# Patient Record
Sex: FEMALE | Race: OTHER | HISPANIC OR LATINO | URBAN - METROPOLITAN AREA
[De-identification: names, ages, dates, MRNs, and addresses within clinical notes are randomized per-mention and may not be internally consistent; named-entity substitution may affect disease eponyms.]

---

## 2018-06-04 ENCOUNTER — EMERGENCY (EMERGENCY)
Facility: HOSPITAL | Age: 33
LOS: 1 days | Discharge: HOME | End: 2018-06-04
Attending: EMERGENCY MEDICINE | Admitting: EMERGENCY MEDICINE
Payer: MEDICAID

## 2018-06-04 VITALS
SYSTOLIC BLOOD PRESSURE: 114 MMHG | DIASTOLIC BLOOD PRESSURE: 71 MMHG | HEART RATE: 61 BPM | OXYGEN SATURATION: 100 % | RESPIRATION RATE: 14 BRPM

## 2018-06-04 VITALS
RESPIRATION RATE: 16 BRPM | SYSTOLIC BLOOD PRESSURE: 120 MMHG | TEMPERATURE: 98 F | HEART RATE: 86 BPM | DIASTOLIC BLOOD PRESSURE: 79 MMHG | OXYGEN SATURATION: 100 %

## 2018-06-04 LAB
ALBUMIN SERPL ELPH-MCNC: 4.3 G/DL — SIGNIFICANT CHANGE UP (ref 3.3–5)
ALP SERPL-CCNC: 66 U/L — SIGNIFICANT CHANGE UP (ref 40–120)
ALT FLD-CCNC: 20 U/L — SIGNIFICANT CHANGE UP (ref 4–33)
APPEARANCE UR: SIGNIFICANT CHANGE UP
AST SERPL-CCNC: 30 U/L — SIGNIFICANT CHANGE UP (ref 4–32)
BACTERIA # UR AUTO: SIGNIFICANT CHANGE UP
BASE EXCESS BLDV CALC-SCNC: -2 MMOL/L — SIGNIFICANT CHANGE UP
BASOPHILS # BLD AUTO: 0.02 K/UL — SIGNIFICANT CHANGE UP (ref 0–0.2)
BASOPHILS NFR BLD AUTO: 0.5 % — SIGNIFICANT CHANGE UP (ref 0–2)
BILIRUB SERPL-MCNC: 0.3 MG/DL — SIGNIFICANT CHANGE UP (ref 0.2–1.2)
BILIRUB UR-MCNC: NEGATIVE — SIGNIFICANT CHANGE UP
BLOOD GAS VENOUS - CREATININE: 0.59 MG/DL — SIGNIFICANT CHANGE UP (ref 0.5–1.3)
BLOOD UR QL VISUAL: HIGH
BUN SERPL-MCNC: 10 MG/DL — SIGNIFICANT CHANGE UP (ref 7–23)
CALCIUM SERPL-MCNC: 8.7 MG/DL — SIGNIFICANT CHANGE UP (ref 8.4–10.5)
CHLORIDE BLDV-SCNC: 111 MMOL/L — HIGH (ref 96–108)
CHLORIDE SERPL-SCNC: 102 MMOL/L — SIGNIFICANT CHANGE UP (ref 98–107)
CO2 SERPL-SCNC: 22 MMOL/L — SIGNIFICANT CHANGE UP (ref 22–31)
COLOR SPEC: YELLOW — SIGNIFICANT CHANGE UP
CREAT SERPL-MCNC: 0.68 MG/DL — SIGNIFICANT CHANGE UP (ref 0.5–1.3)
EOSINOPHIL # BLD AUTO: 0.11 K/UL — SIGNIFICANT CHANGE UP (ref 0–0.5)
EOSINOPHIL NFR BLD AUTO: 2.6 % — SIGNIFICANT CHANGE UP (ref 0–6)
GAS PNL BLDV: 134 MMOL/L — LOW (ref 136–146)
GLUCOSE BLDV-MCNC: 126 — HIGH (ref 70–99)
GLUCOSE SERPL-MCNC: 133 MG/DL — HIGH (ref 70–99)
GLUCOSE UR-MCNC: NEGATIVE — SIGNIFICANT CHANGE UP
HCG SERPL-ACNC: < 5 MIU/ML — SIGNIFICANT CHANGE UP
HCO3 BLDV-SCNC: 23 MMOL/L — SIGNIFICANT CHANGE UP (ref 20–27)
HCT VFR BLD CALC: 36.6 % — SIGNIFICANT CHANGE UP (ref 34.5–45)
HCT VFR BLDV CALC: 41.5 % — SIGNIFICANT CHANGE UP (ref 34.5–45)
HGB BLD-MCNC: 12.5 G/DL — SIGNIFICANT CHANGE UP (ref 11.5–15.5)
HGB BLDV-MCNC: 13.5 G/DL — SIGNIFICANT CHANGE UP (ref 11.5–15.5)
IMM GRANULOCYTES # BLD AUTO: 0 # — SIGNIFICANT CHANGE UP
IMM GRANULOCYTES NFR BLD AUTO: 0 % — SIGNIFICANT CHANGE UP (ref 0–1.5)
KETONES UR-MCNC: NEGATIVE — SIGNIFICANT CHANGE UP
LACTATE BLDV-MCNC: 1.7 MMOL/L — SIGNIFICANT CHANGE UP (ref 0.5–2)
LEUKOCYTE ESTERASE UR-ACNC: NEGATIVE — SIGNIFICANT CHANGE UP
LIDOCAIN IGE QN: 38.7 U/L — SIGNIFICANT CHANGE UP (ref 7–60)
LYMPHOCYTES # BLD AUTO: 1.16 K/UL — SIGNIFICANT CHANGE UP (ref 1–3.3)
LYMPHOCYTES # BLD AUTO: 27.1 % — SIGNIFICANT CHANGE UP (ref 13–44)
MCHC RBC-ENTMCNC: 31.3 PG — SIGNIFICANT CHANGE UP (ref 27–34)
MCHC RBC-ENTMCNC: 34.2 % — SIGNIFICANT CHANGE UP (ref 32–36)
MCV RBC AUTO: 91.5 FL — SIGNIFICANT CHANGE UP (ref 80–100)
MONOCYTES # BLD AUTO: 0.35 K/UL — SIGNIFICANT CHANGE UP (ref 0–0.9)
MONOCYTES NFR BLD AUTO: 8.2 % — SIGNIFICANT CHANGE UP (ref 2–14)
MUCOUS THREADS # UR AUTO: SIGNIFICANT CHANGE UP
NEUTROPHILS # BLD AUTO: 2.64 K/UL — SIGNIFICANT CHANGE UP (ref 1.8–7.4)
NEUTROPHILS NFR BLD AUTO: 61.6 % — SIGNIFICANT CHANGE UP (ref 43–77)
NITRITE UR-MCNC: NEGATIVE — SIGNIFICANT CHANGE UP
NRBC # FLD: 0 — SIGNIFICANT CHANGE UP
OB PNL STL: NEGATIVE — SIGNIFICANT CHANGE UP
PCO2 BLDV: 38 MMHG — LOW (ref 41–51)
PH BLDV: 7.39 PH — SIGNIFICANT CHANGE UP (ref 7.32–7.43)
PH UR: 6.5 — SIGNIFICANT CHANGE UP (ref 4.6–8)
PLATELET # BLD AUTO: 214 K/UL — SIGNIFICANT CHANGE UP (ref 150–400)
PMV BLD: 9.7 FL — SIGNIFICANT CHANGE UP (ref 7–13)
PO2 BLDV: 53 MMHG — HIGH (ref 35–40)
POTASSIUM BLDV-SCNC: 4.1 MMOL/L — SIGNIFICANT CHANGE UP (ref 3.4–4.5)
POTASSIUM SERPL-MCNC: 4.1 MMOL/L — SIGNIFICANT CHANGE UP (ref 3.5–5.3)
POTASSIUM SERPL-SCNC: 4.1 MMOL/L — SIGNIFICANT CHANGE UP (ref 3.5–5.3)
PROT SERPL-MCNC: 7.6 G/DL — SIGNIFICANT CHANGE UP (ref 6–8.3)
PROT UR-MCNC: 20 MG/DL — SIGNIFICANT CHANGE UP
RBC # BLD: 4 M/UL — SIGNIFICANT CHANGE UP (ref 3.8–5.2)
RBC # FLD: 12.2 % — SIGNIFICANT CHANGE UP (ref 10.3–14.5)
RBC CASTS # UR COMP ASSIST: SIGNIFICANT CHANGE UP (ref 0–?)
SAO2 % BLDV: 85.8 % — HIGH (ref 60–85)
SODIUM SERPL-SCNC: 137 MMOL/L — SIGNIFICANT CHANGE UP (ref 135–145)
SP GR SPEC: 1.02 — SIGNIFICANT CHANGE UP (ref 1–1.04)
SQUAMOUS # UR AUTO: SIGNIFICANT CHANGE UP
UROBILINOGEN FLD QL: NORMAL MG/DL — SIGNIFICANT CHANGE UP
WBC # BLD: 4.28 K/UL — SIGNIFICANT CHANGE UP (ref 3.8–10.5)
WBC # FLD AUTO: 4.28 K/UL — SIGNIFICANT CHANGE UP (ref 3.8–10.5)
WBC UR QL: SIGNIFICANT CHANGE UP (ref 0–?)

## 2018-06-04 PROCEDURE — 76705 ECHO EXAM OF ABDOMEN: CPT | Mod: 26

## 2018-06-04 PROCEDURE — 99284 EMERGENCY DEPT VISIT MOD MDM: CPT | Mod: 25

## 2018-06-04 PROCEDURE — 74177 CT ABD & PELVIS W/CONTRAST: CPT | Mod: 26

## 2018-06-04 RX ORDER — LIDOCAINE 4 G/100G
10 CREAM TOPICAL ONCE
Qty: 0 | Refills: 0 | Status: COMPLETED | OUTPATIENT
Start: 2018-06-04 | End: 2018-06-04

## 2018-06-04 RX ORDER — SODIUM CHLORIDE 9 MG/ML
1000 INJECTION INTRAMUSCULAR; INTRAVENOUS; SUBCUTANEOUS ONCE
Qty: 0 | Refills: 0 | Status: COMPLETED | OUTPATIENT
Start: 2018-06-04 | End: 2018-06-04

## 2018-06-04 RX ORDER — ACETAMINOPHEN 500 MG
975 TABLET ORAL ONCE
Qty: 0 | Refills: 0 | Status: COMPLETED | OUTPATIENT
Start: 2018-06-04 | End: 2018-06-04

## 2018-06-04 RX ORDER — FAMOTIDINE 10 MG/ML
20 INJECTION INTRAVENOUS ONCE
Qty: 0 | Refills: 0 | Status: COMPLETED | OUTPATIENT
Start: 2018-06-04 | End: 2018-06-04

## 2018-06-04 RX ADMIN — Medication 30 MILLILITER(S): at 08:41

## 2018-06-04 RX ADMIN — FAMOTIDINE 20 MILLIGRAM(S): 10 INJECTION INTRAVENOUS at 08:41

## 2018-06-04 RX ADMIN — LIDOCAINE 10 MILLILITER(S): 4 CREAM TOPICAL at 08:41

## 2018-06-04 RX ADMIN — Medication 975 MILLIGRAM(S): at 11:27

## 2018-06-04 RX ADMIN — SODIUM CHLORIDE 1000 MILLILITER(S): 9 INJECTION INTRAMUSCULAR; INTRAVENOUS; SUBCUTANEOUS at 08:41

## 2018-06-04 NOTE — ED PROVIDER NOTE - OBJECTIVE STATEMENT
Sirisha Dewitt, DO: 33F with hx of asthma here for upper, b/l abdominal pain constant x 2-3 days better with tea & advil.  Pain has been ongoing 3-4 months, previously 1x a week. Previously thought to be related to pelvic infection, treated with antibiotics. Denies vaginal discharge or bleeding. LMP 05/09. + chronic constipation, last BM 2 days ago & abdominal distention, melena. No nausea/vomiting, diarrhea, hematochezia.     PMD: Clinic  PMH: Asthma  PSH: None  Allergies: None Sirisha Dewitt, DO: 33F with hx of asthma here for upper, b/l abdominal pain constant x 2-3 days better with tea & advil, worse with eating.  Pain has been ongoing 3-4 months, previously 1x a week and thought to be related to pelvic infection, treated with antibiotics. Denies vaginal discharge or bleeding. LMP 05/09. + chronic constipation, last BM 2 days ago & abdominal distention, melena. No nausea/vomiting, diarrhea, hematochezia, urinary symptoms.    PMH: Asthma  PSH: None  Allergies: None  Social Hx: non-smoker, no ETOH  PMD: Clinic Sirisha Dewitt, DO: 33F with hx of asthma here for upper, b/l abdominal pain constant x 2-3 days better with tea & advil, worse with eating.  Pain has been ongoing 3-4 months, previously 1x a week and thought to be related to pelvic infection, treated with antibiotics. Denies vaginal discharge or bleeding. LMP 05/09. + chronic constipation, last BM 2 days ago & abdominal distention, melena. No nausea/vomiting, diarrhea, hematochezia, urinary symptoms.    PMH: Asthma  PSH: None  Allergies: None  Social Hx: non-smoker, no ETOH  PMD: Clinic    Hx obtained via "[employer] Sirisha Dewitt, DO: 33F with hx of asthma here for upper, b/l abdominal pain constant x 2-3 days better with tea & advil, worse with eating.  Pain has been ongoing 3-4 months, previously 1x a week and thought to be related to pelvic infection, treated with antibiotics. Denies vaginal discharge or bleeding. LMP 05/09. + chronic constipation, last BM 2 days ago & abdominal distention, melena. No nausea/vomiting, diarrhea, hematochezia, urinary symptoms.     PMH: Asthma  PSH: None  Allergies: None  Social Hx: non-smoker, no ETOH  PMD: Clinic    Hx obtained via [employer].

## 2018-06-04 NOTE — ED PROVIDER NOTE - ATTENDING CONTRIBUTION TO CARE
ANTONELLA Attending Note - Dr. Sevilla  33F with hx of asthma here for upper, b/l abdominal pain constant x 2-3 days better with tea & advil, worse with eating reoccurring over past 3-4 months,  PE: pt is alert and oriented, EYE: perrl, ENT normal, membranes are moist, neck supple. no lymphadenopathy or thyroid enlargement, No JVD.  Chest clear to P&A, Heart- reg rhythm without murmur, rubs or gallops, radial pulses equal bilaterally.  Abd is soft, non-tender, Bowel sounds are active. no mass or organomegaly. : No CVA tenderness. Neuro:  Pt alert and oriented x 3. Perrl    Distal neurosensory is intact. Motor function is 5/5 strength bilaterally.  No focal deficits. Extremities:  No edema.  Skin: warm and dry.  Psych: No mood or thought abnormality.  Plan:     Impression: ANTONELLA Attending Note - Dr. Sevilla  33F with hx of asthma here for upper, b/l abdominal pain constant x 2-3 days better with tea & advil, worse with eating reoccurring over past 3-4 months,  PE: pt is alert and oriented, EYE: perrl, ENT normal, membranes are moist, neck supple. no lymphadenopathy or thyroid enlargement, No JVD.  Chest clear to P&A, Heart- reg rhythm without murmur, rubs or gallops, radial pulses equal bilaterally.  Abd is soft with right upper quadrant and epigastric tenderness. tender, Bowel sounds are active. no mass or organomegaly. : No CVA tenderness. Neuro:  Pt alert and oriented x 3. Perrl    Distal neurosensory is intact. Motor function is 5/5 strength bilaterally.  No focal deficits. Extremities:  No edema.  Skin: warm and dry.  Psych: No mood or thought abnormality.  Plan: Labs, including CBC, CMP, VBG, U/A, CXR, EKG, IV Hydration and ultrasound.       Impression:  Epigastric pain R/O Biliary colic vs. Acute Cholecystitis.

## 2018-06-04 NOTE — ED PROVIDER NOTE - CARE PLAN
Assessment and plan of treatment:	1. Please follow up with your PMD in 24-48 hours to review your results.   2. Please call a surgeon on the provided list OR Call [phone] to find a doctor affiliated with [facility] in your neighborhood.   3. Please return to the ED should you have any new or worsening symptoms or have any new concerns including but not limited to vomiting after every time you eat, worsening pain, fevers.  4. Read all attached. Principal Discharge DX:	Biliary colic symptom  Assessment and plan of treatment:	1. Please follow up with your PMD in 24-48 hours to review your results.   2. Please call a surgeon on the provided list OR Call [phone] to find a doctor affiliated with [facility] in your neighborhood.   3. Please return to the ED should you have any new or worsening symptoms or have any new concerns including but not limited to vomiting after every time you eat, worsening pain, fevers.  4. Read all attached.

## 2018-06-04 NOTE — ED PROVIDER NOTE - PROGRESS NOTE DETAILS
Sirisha Dewitt DO: Patient updated of results & provided with a copy. Patient to follow up with surgeon outpatient & given strict return precautions.

## 2018-06-04 NOTE — ED PROVIDER NOTE - PLAN OF CARE
1. Please follow up with your PMD in 24-48 hours to review your results.   2. Please call a surgeon on the provided list OR Call [phone] to find a doctor affiliated with [facility] in your neighborhood.   3. Please return to the ED should you have any new or worsening symptoms or have any new concerns including but not limited to vomiting after every time you eat, worsening pain, fevers.  4. Read all attached.

## 2018-06-04 NOTE — ED ADULT TRIAGE NOTE - CHIEF COMPLAINT QUOTE
C/o intermittent upper abdominal pain x 3 months, worse over past 3-4 days. Denies N/V/D, fevers or chills. Denies medical hx.

## 2018-06-04 NOTE — ED PROVIDER NOTE - PHYSICAL EXAMINATION
Sirisha Dewitt, DO:   Gen: Well appearing, NAD, obese  Head: NCAT  HEENT: PERRL, MMM, normal conjunctiva, anicteric, neck supple  Lung: CTAB, no adventitious sounds  CV: RRR, no murmurs, rubs or gallops  Abd: soft, TTP in RUQ without guarding, negative [place], no CVAT  MSK: No edema, no visible deformities  Neuro: Ambulatory with steady gait.   Skin: Warm and dry, no evidence of rash  Psych: normal mood and affect Sirisha Dewitt, DO:   Gen: Well appearing, NAD, obese  Head: NCAT  HEENT: PERRL, MMM, normal conjunctiva, anicteric, neck supple  Lung: CTAB, no adventitious sounds  CV: RRR, no murmurs, rubs or gallops  Abd: soft, TTP in RUQ without guarding, negative [place], no CVAT, no external hemorrhoids, stool brown without kika blood (chaperone: JOAQUINA saavedra).   MSK: No edema, no visible deformities  Neuro: Ambulatory with steady gait.   Skin: Warm and dry, no evidence of rash  Psych: normal mood and affect

## 2018-06-04 NOTE — ED PROVIDER NOTE - MEDICAL DECISION MAKING DETAILS
Siirsha Dewitt, DO: 33F with RUQ pain x 3 months seen at PMD and acutely worse over last 2-3 days, now constant. Pain worse with eating. Afebrile. LMP ~1 month ago. Suspect biliary vs. ulcerative cause. Plan for RUQ US, labs including lipase and possible CT scan if work-up negative. Patient aware that if work-up unremarkable, patient to follow up with gastroenterologist.

## 2018-06-05 ENCOUNTER — APPOINTMENT (OUTPATIENT)
Dept: SURGERY | Facility: CLINIC | Age: 33
End: 2018-06-05
Payer: MEDICAID

## 2018-06-05 VITALS
DIASTOLIC BLOOD PRESSURE: 85 MMHG | TEMPERATURE: 98.3 F | BODY MASS INDEX: 35.08 KG/M2 | SYSTOLIC BLOOD PRESSURE: 127 MMHG | WEIGHT: 198 LBS | HEART RATE: 102 BPM | HEIGHT: 63 IN

## 2018-06-05 PROBLEM — Z00.00 ENCOUNTER FOR PREVENTIVE HEALTH EXAMINATION: Status: ACTIVE | Noted: 2018-06-05

## 2018-06-05 PROCEDURE — 99202 OFFICE O/P NEW SF 15 MIN: CPT

## 2018-06-06 ENCOUNTER — TRANSCRIPTION ENCOUNTER (OUTPATIENT)
Age: 33
End: 2018-06-06

## 2018-06-06 ENCOUNTER — OUTPATIENT (OUTPATIENT)
Dept: OUTPATIENT SERVICES | Facility: HOSPITAL | Age: 33
LOS: 1 days | End: 2018-06-06
Payer: MEDICAID

## 2018-06-06 VITALS
HEART RATE: 80 BPM | RESPIRATION RATE: 14 BRPM | TEMPERATURE: 98 F | WEIGHT: 195.99 LBS | DIASTOLIC BLOOD PRESSURE: 85 MMHG | SYSTOLIC BLOOD PRESSURE: 124 MMHG | HEIGHT: 63 IN

## 2018-06-06 DIAGNOSIS — K80.10 CALCULUS OF GALLBLADDER WITH CHRONIC CHOLECYSTITIS WITHOUT OBSTRUCTION: ICD-10-CM

## 2018-06-06 DIAGNOSIS — Z01.818 ENCOUNTER FOR OTHER PREPROCEDURAL EXAMINATION: ICD-10-CM

## 2018-06-06 LAB — HCG SERPL-ACNC: <1 MIU/ML — SIGNIFICANT CHANGE UP

## 2018-06-06 PROCEDURE — 86850 RBC ANTIBODY SCREEN: CPT

## 2018-06-06 PROCEDURE — 86901 BLOOD TYPING SEROLOGIC RH(D): CPT

## 2018-06-06 PROCEDURE — G0463: CPT

## 2018-06-06 PROCEDURE — 86900 BLOOD TYPING SEROLOGIC ABO: CPT

## 2018-06-06 PROCEDURE — 84702 CHORIONIC GONADOTROPIN TEST: CPT

## 2018-06-06 RX ORDER — SODIUM CHLORIDE 9 MG/ML
1000 INJECTION, SOLUTION INTRAVENOUS
Qty: 0 | Refills: 0 | Status: DISCONTINUED | OUTPATIENT
Start: 2018-06-07 | End: 2018-06-07

## 2018-06-06 NOTE — H&P PST ADULT - RESPIRATORY AND THORAX COMMENTS
H/O Asthma - denies any Wheezing or need for her Ventolin Inhaler today - did note some wheezing earlier in the week

## 2018-06-06 NOTE — H&P PST ADULT - NSANTHOSAYNRD_GEN_A_CORE
No. BERYL screening performed.  STOP BANG Legend: 0-2 = LOW Risk; 3-4 = INTERMEDIATE Risk; 5-8 = HIGH Risk

## 2018-06-06 NOTE — H&P PST ADULT - HISTORY OF PRESENT ILLNESS
33 yr old Kittitian speaking female PMH Asthma presents for PST prior to Laparoscopic Cholecystectomy - Possible Open with Dr Jake Ramsey on 6/7/18 - pt notes she went to [facility] 6/4/18 with complaints of abdominal pain  - pt notes this has been going on for 3-4 months - (pt thought pain was related to a pelvic infection and was treated with ABX) CT Scan was obtained and indicated Gallstones - pt was discharged to home - instructed to follow up with Dr Ramsey - following consult and exam with Dr Ramsey pt is electing for scheduled procedure.    [place] [employer] Phone Used - [employer] [place] # [id number]

## 2018-06-06 NOTE — H&P PST ADULT - PROBLEM SELECTOR PLAN 1
PST Labs; HcG, Type & Screen done at [facility] - (Labs - CBC, CMP on chart from [facility] on 6/4/18) - No Medical Clearance Needed - pre-op instructions as well as pre-op wash instructions given to pt with understanding verbalized - pt also instructed to take Ventolin Inhaler morning of surgery and to bring her Ventolin inhaler with her in case needed - understanding of all instructions verbalized

## 2018-06-06 NOTE — H&P PST ADULT - ASSESSMENT
33 year old female with Calculus of Gallbladder with Chronic Cholecystitis without obstruction - scheduled for Laparoscopic Cholecystectomy - Poss Open with Dr Jake Ramsey on 6/7/18

## 2018-06-07 ENCOUNTER — RESULT REVIEW (OUTPATIENT)
Age: 33
End: 2018-06-07

## 2018-06-07 ENCOUNTER — APPOINTMENT (OUTPATIENT)
Dept: SURGERY | Facility: HOSPITAL | Age: 33
End: 2018-06-07

## 2018-06-07 ENCOUNTER — OUTPATIENT (OUTPATIENT)
Dept: OUTPATIENT SERVICES | Facility: HOSPITAL | Age: 33
LOS: 1 days | End: 2018-06-07
Payer: MEDICAID

## 2018-06-07 VITALS
RESPIRATION RATE: 14 BRPM | WEIGHT: 195.99 LBS | TEMPERATURE: 97 F | DIASTOLIC BLOOD PRESSURE: 70 MMHG | HEIGHT: 63 IN | HEART RATE: 86 BPM | SYSTOLIC BLOOD PRESSURE: 122 MMHG | OXYGEN SATURATION: 96 %

## 2018-06-07 VITALS
SYSTOLIC BLOOD PRESSURE: 122 MMHG | HEART RATE: 80 BPM | DIASTOLIC BLOOD PRESSURE: 78 MMHG | RESPIRATION RATE: 14 BRPM | OXYGEN SATURATION: 96 %

## 2018-06-07 DIAGNOSIS — K80.10 CALCULUS OF GALLBLADDER WITH CHRONIC CHOLECYSTITIS WITHOUT OBSTRUCTION: ICD-10-CM

## 2018-06-07 DIAGNOSIS — Z01.818 ENCOUNTER FOR OTHER PREPROCEDURAL EXAMINATION: ICD-10-CM

## 2018-06-07 PROCEDURE — 88304 TISSUE EXAM BY PATHOLOGIST: CPT

## 2018-06-07 PROCEDURE — 47562 LAPAROSCOPIC CHOLECYSTECTOMY: CPT

## 2018-06-07 PROCEDURE — 88304 TISSUE EXAM BY PATHOLOGIST: CPT | Mod: 26

## 2018-06-07 RX ORDER — HYDROMORPHONE HYDROCHLORIDE 2 MG/ML
0.5 INJECTION INTRAMUSCULAR; INTRAVENOUS; SUBCUTANEOUS
Qty: 0 | Refills: 0 | Status: DISCONTINUED | OUTPATIENT
Start: 2018-06-07 | End: 2018-06-07

## 2018-06-07 RX ORDER — CEFAZOLIN SODIUM 1 G
2000 VIAL (EA) INJECTION ONCE
Qty: 0 | Refills: 0 | Status: COMPLETED | OUTPATIENT
Start: 2018-06-07 | End: 2018-06-07

## 2018-06-07 RX ORDER — ONDANSETRON 8 MG/1
4 TABLET, FILM COATED ORAL ONCE
Qty: 0 | Refills: 0 | Status: DISCONTINUED | OUTPATIENT
Start: 2018-06-07 | End: 2018-06-07

## 2018-06-07 RX ORDER — OXYCODONE HYDROCHLORIDE 5 MG/1
1 TABLET ORAL
Qty: 20 | Refills: 0 | OUTPATIENT
Start: 2018-06-07 | End: 2018-06-11

## 2018-06-07 RX ORDER — IBUPROFEN 200 MG
200 TABLET ORAL
Qty: 0 | Refills: 0 | COMMUNITY

## 2018-06-07 RX ORDER — SODIUM CHLORIDE 9 MG/ML
1000 INJECTION, SOLUTION INTRAVENOUS
Qty: 0 | Refills: 0 | Status: DISCONTINUED | OUTPATIENT
Start: 2018-06-07 | End: 2018-06-07

## 2018-06-07 RX ADMIN — HYDROMORPHONE HYDROCHLORIDE 0.5 MILLIGRAM(S): 2 INJECTION INTRAMUSCULAR; INTRAVENOUS; SUBCUTANEOUS at 13:45

## 2018-06-07 RX ADMIN — HYDROMORPHONE HYDROCHLORIDE 0.5 MILLIGRAM(S): 2 INJECTION INTRAMUSCULAR; INTRAVENOUS; SUBCUTANEOUS at 17:00

## 2018-06-07 RX ADMIN — SODIUM CHLORIDE 60 MILLILITER(S): 9 INJECTION, SOLUTION INTRAVENOUS at 10:04

## 2018-06-07 RX ADMIN — SODIUM CHLORIDE 100 MILLILITER(S): 9 INJECTION, SOLUTION INTRAVENOUS at 13:50

## 2018-06-07 RX ADMIN — HYDROMORPHONE HYDROCHLORIDE 0.5 MILLIGRAM(S): 2 INJECTION INTRAMUSCULAR; INTRAVENOUS; SUBCUTANEOUS at 16:45

## 2018-06-07 RX ADMIN — HYDROMORPHONE HYDROCHLORIDE 0.5 MILLIGRAM(S): 2 INJECTION INTRAMUSCULAR; INTRAVENOUS; SUBCUTANEOUS at 13:50

## 2018-06-07 NOTE — ASU DISCHARGE PLAN (ADULT/PEDIATRIC). - MEDICATION SUMMARY - MEDICATIONS TO TAKE
I will START or STAY ON the medications listed below when I get home from the hospital:    Aleve 220 mg oral capsule  -- 1 cap(s) by mouth every 12 hours, As Needed  -- Indication: For MODERATE PAIN     Tylenol Extra Strength 500 mg oral tablet  -- 2 tab(s) by mouth every 8 hours, As Needed  -- Indication: For MILD PAIN     oxyCODONE 5 mg oral tablet  -- 1 tab(s) by mouth every 6 hours, As Needed -for severe pain MDD:4   -- Caution federal law prohibits the transfer of this drug to any person other  than the person for whom it was prescribed.  It is very important that you take or use this exactly as directed.  Do not skip doses or discontinue unless directed by your doctor.  May cause drowsiness.  Alcohol may intensify this effect.  Use care when operating dangerous machinery.  This prescription cannot be refilled.  Using more of this medication than prescribed may cause serious breathing problems.    -- Indication: For SEVERE PAIN     Ventolin HFA 90 mcg/inh inhalation aerosol  -- 2 puff(s) inhaled prn  -- Indication: For HOME MEDICATION

## 2018-06-07 NOTE — BRIEF OPERATIVE NOTE - PROCEDURE
<<-----Click on this checkbox to enter Procedure Cholecystectomy, laparoscopic  06/07/2018    Active  GARRET

## 2018-06-07 NOTE — ASU DISCHARGE PLAN (ADULT/PEDIATRIC). - MEDICATION SUMMARY - MEDICATIONS TO STOP TAKING
I will STOP taking the medications listed below when I get home from the hospital:    Motrin  -- 200 milligram(s) by mouth prn

## 2018-06-20 ENCOUNTER — APPOINTMENT (OUTPATIENT)
Dept: SURGERY | Facility: CLINIC | Age: 33
End: 2018-06-20
Payer: MEDICAID

## 2018-06-20 VITALS
OXYGEN SATURATION: 98 % | WEIGHT: 198 LBS | BODY MASS INDEX: 35.08 KG/M2 | HEART RATE: 71 BPM | HEIGHT: 63 IN | DIASTOLIC BLOOD PRESSURE: 73 MMHG | SYSTOLIC BLOOD PRESSURE: 110 MMHG

## 2018-06-20 DIAGNOSIS — Z71.9 COUNSELING, UNSPECIFIED: ICD-10-CM

## 2018-06-20 DIAGNOSIS — Z87.09 PERSONAL HISTORY OF OTHER DISEASES OF THE RESPIRATORY SYSTEM: ICD-10-CM

## 2018-06-20 PROCEDURE — 99024 POSTOP FOLLOW-UP VISIT: CPT

## 2019-03-30 ENCOUNTER — EMERGENCY (EMERGENCY)
Facility: HOSPITAL | Age: 34
LOS: 1 days | Discharge: HOME | End: 2019-03-30
Attending: EMERGENCY MEDICINE | Admitting: EMERGENCY MEDICINE
Payer: MEDICAID

## 2019-03-30 VITALS
RESPIRATION RATE: 18 BRPM | SYSTOLIC BLOOD PRESSURE: 158 MMHG | OXYGEN SATURATION: 100 % | HEART RATE: 93 BPM | DIASTOLIC BLOOD PRESSURE: 95 MMHG | TEMPERATURE: 98 F

## 2019-03-30 LAB
ALBUMIN SERPL ELPH-MCNC: 4.4 G/DL — SIGNIFICANT CHANGE UP (ref 3.3–5)
ALP SERPL-CCNC: 103 U/L — SIGNIFICANT CHANGE UP (ref 40–120)
ALT FLD-CCNC: 295 U/L — HIGH (ref 4–33)
ANION GAP SERPL CALC-SCNC: 18 MMO/L — HIGH (ref 7–14)
AST SERPL-CCNC: 214 U/L — HIGH (ref 4–32)
BASOPHILS # BLD AUTO: 0.03 K/UL — SIGNIFICANT CHANGE UP (ref 0–0.2)
BASOPHILS NFR BLD AUTO: 0.7 % — SIGNIFICANT CHANGE UP (ref 0–2)
BILIRUB SERPL-MCNC: 2.9 MG/DL — HIGH (ref 0.2–1.2)
BUN SERPL-MCNC: 7 MG/DL — SIGNIFICANT CHANGE UP (ref 7–23)
CALCIUM SERPL-MCNC: 9.3 MG/DL — SIGNIFICANT CHANGE UP (ref 8.4–10.5)
CHLORIDE SERPL-SCNC: 101 MMOL/L — SIGNIFICANT CHANGE UP (ref 98–107)
CO2 SERPL-SCNC: 21 MMOL/L — LOW (ref 22–31)
CREAT SERPL-MCNC: 0.71 MG/DL — SIGNIFICANT CHANGE UP (ref 0.5–1.3)
EOSINOPHIL # BLD AUTO: 0.1 K/UL — SIGNIFICANT CHANGE UP (ref 0–0.5)
EOSINOPHIL NFR BLD AUTO: 2.3 % — SIGNIFICANT CHANGE UP (ref 0–6)
GLUCOSE SERPL-MCNC: 126 MG/DL — HIGH (ref 70–99)
HCT VFR BLD CALC: 41 % — SIGNIFICANT CHANGE UP (ref 34.5–45)
HGB BLD-MCNC: 13.8 G/DL — SIGNIFICANT CHANGE UP (ref 11.5–15.5)
IMM GRANULOCYTES NFR BLD AUTO: 0.5 % — SIGNIFICANT CHANGE UP (ref 0–1.5)
LIDOCAIN IGE QN: 21.6 U/L — SIGNIFICANT CHANGE UP (ref 7–60)
LYMPHOCYTES # BLD AUTO: 1.24 K/UL — SIGNIFICANT CHANGE UP (ref 1–3.3)
LYMPHOCYTES # BLD AUTO: 28.1 % — SIGNIFICANT CHANGE UP (ref 13–44)
MCHC RBC-ENTMCNC: 31.2 PG — SIGNIFICANT CHANGE UP (ref 27–34)
MCHC RBC-ENTMCNC: 33.7 % — SIGNIFICANT CHANGE UP (ref 32–36)
MCV RBC AUTO: 92.6 FL — SIGNIFICANT CHANGE UP (ref 80–100)
MONOCYTES # BLD AUTO: 0.34 K/UL — SIGNIFICANT CHANGE UP (ref 0–0.9)
MONOCYTES NFR BLD AUTO: 7.7 % — SIGNIFICANT CHANGE UP (ref 2–14)
NEUTROPHILS # BLD AUTO: 2.69 K/UL — SIGNIFICANT CHANGE UP (ref 1.8–7.4)
NEUTROPHILS NFR BLD AUTO: 60.7 % — SIGNIFICANT CHANGE UP (ref 43–77)
NRBC # FLD: 0 K/UL — SIGNIFICANT CHANGE UP (ref 0–0)
PLATELET # BLD AUTO: 243 K/UL — SIGNIFICANT CHANGE UP (ref 150–400)
PMV BLD: 9.9 FL — SIGNIFICANT CHANGE UP (ref 7–13)
POTASSIUM SERPL-MCNC: 3.9 MMOL/L — SIGNIFICANT CHANGE UP (ref 3.5–5.3)
POTASSIUM SERPL-SCNC: 3.9 MMOL/L — SIGNIFICANT CHANGE UP (ref 3.5–5.3)
PROT SERPL-MCNC: 7.9 G/DL — SIGNIFICANT CHANGE UP (ref 6–8.3)
RBC # BLD: 4.43 M/UL — SIGNIFICANT CHANGE UP (ref 3.8–5.2)
RBC # FLD: 12.5 % — SIGNIFICANT CHANGE UP (ref 10.3–14.5)
SODIUM SERPL-SCNC: 140 MMOL/L — SIGNIFICANT CHANGE UP (ref 135–145)
WBC # BLD: 4.42 K/UL — SIGNIFICANT CHANGE UP (ref 3.8–10.5)
WBC # FLD AUTO: 4.42 K/UL — SIGNIFICANT CHANGE UP (ref 3.8–10.5)

## 2019-03-30 PROCEDURE — 93010 ELECTROCARDIOGRAM REPORT: CPT

## 2019-03-30 PROCEDURE — 99284 EMERGENCY DEPT VISIT MOD MDM: CPT | Mod: 25

## 2019-03-30 PROCEDURE — 71046 X-RAY EXAM CHEST 2 VIEWS: CPT | Mod: 26

## 2019-03-30 RX ORDER — LIDOCAINE 4 G/100G
10 CREAM TOPICAL ONCE
Qty: 0 | Refills: 0 | Status: COMPLETED | OUTPATIENT
Start: 2019-03-30 | End: 2019-03-30

## 2019-03-30 RX ORDER — FAMOTIDINE 10 MG/ML
20 INJECTION INTRAVENOUS ONCE
Qty: 0 | Refills: 0 | Status: COMPLETED | OUTPATIENT
Start: 2019-03-30 | End: 2019-03-30

## 2019-03-30 RX ORDER — OMEPRAZOLE 10 MG/1
1 CAPSULE, DELAYED RELEASE ORAL
Qty: 14 | Refills: 0 | OUTPATIENT
Start: 2019-03-30 | End: 2019-04-12

## 2019-03-30 RX ORDER — POLYETHYLENE GLYCOL 3350 17 G/17G
17 POWDER, FOR SOLUTION ORAL
Qty: 1 | Refills: 0 | OUTPATIENT
Start: 2019-03-30 | End: 2019-04-05

## 2019-03-30 RX ADMIN — Medication 30 MILLILITER(S): at 20:04

## 2019-03-30 RX ADMIN — LIDOCAINE 10 MILLILITER(S): 4 CREAM TOPICAL at 20:05

## 2019-03-30 RX ADMIN — FAMOTIDINE 20 MILLIGRAM(S): 10 INJECTION INTRAVENOUS at 20:04

## 2019-03-30 NOTE — ED PROVIDER NOTE - OBJECTIVE STATEMENT
34 yr old female with hx of cholecystectomy, obese and asthma never intubated presents to ed c/o 3 month of left upper back pain now with 1 day of band line upper abd pain with a ball sensation at upper epigastric.  + nausea, no vomiting, no diarrhea but baseline constipated. no fever, no chills, + fatigue.  pain seems to start at upper abd then radiates to left upper chest.  no cough, no palpitations, no headache. stay home.  tried tylenol with minimal relieve. never seen a gi doctor

## 2019-03-30 NOTE — ED ADULT NURSE NOTE - TEMPLATE LIST FOR HEAD TO TOE ASSESSMENT
General documentation/conducted a detailed discussion of DNR status/consult w/ pt's family directly relating to pts condition/interpretation of diagnostic studies/direct patient care (not related to procedure)/additional history taking

## 2019-03-30 NOTE — ED PROVIDER NOTE - PROGRESS NOTE DETAILS
Shin: pt states 90% resolved.  feels better.  lab shows elevated LFT.  pt doesn't want to wait for test since have children at home. pt will follow up with gi  dx gastritis.  rx ppi, maalox.  must see gi for elevated lft. miralax for constipation. left ambulatory.  return precautions given.

## 2019-03-30 NOTE — ED PROVIDER NOTE - CLINICAL SUMMARY MEDICAL DECISION MAKING FREE TEXT BOX
34 yr old female with cholecystectomy coming in c/o bandlike, with a globus sensation at epigastric likely gastritis based on h&p and body habitus.  r/o pancreatitis vs pna.  not consistent with cad.  labs, gi cocktail, cxr, po challenge and can f/u with gi outpt

## 2019-03-30 NOTE — ED ADULT TRIAGE NOTE - CHIEF COMPLAINT QUOTE
pt c/o band like pain in upper abd area radiates to left upper abd area and head / lmp mar 4/ pt had gallbladder removed 8 months ago/ pt c/o pain 10/10    and  registrar with pts permission

## 2019-03-31 PROBLEM — J45.909 UNSPECIFIED ASTHMA, UNCOMPLICATED: Chronic | Status: ACTIVE | Noted: 2018-06-06

## 2019-03-31 PROBLEM — K80.10 CALCULUS OF GALLBLADDER WITH CHRONIC CHOLECYSTITIS WITHOUT OBSTRUCTION: Chronic | Status: ACTIVE | Noted: 2018-06-06

## 2019-04-25 ENCOUNTER — INPATIENT (INPATIENT)
Facility: HOSPITAL | Age: 34
LOS: 4 days | Discharge: HOME | End: 2019-04-30
Attending: INTERNAL MEDICINE | Admitting: INTERNAL MEDICINE
Payer: MEDICAID

## 2019-04-25 VITALS
DIASTOLIC BLOOD PRESSURE: 42 MMHG | HEART RATE: 91 BPM | OXYGEN SATURATION: 100 % | TEMPERATURE: 97 F | RESPIRATION RATE: 18 BRPM | SYSTOLIC BLOOD PRESSURE: 98 MMHG

## 2019-04-25 DIAGNOSIS — K85.90 ACUTE PANCREATITIS WITHOUT NECROSIS OR INFECTION, UNSPECIFIED: ICD-10-CM

## 2019-04-25 DIAGNOSIS — Z29.9 ENCOUNTER FOR PROPHYLACTIC MEASURES, UNSPECIFIED: ICD-10-CM

## 2019-04-25 DIAGNOSIS — K85.10 BILIARY ACUTE PANCREATITIS WITHOUT NECROSIS OR INFECTION: ICD-10-CM

## 2019-04-25 DIAGNOSIS — E66.9 OBESITY, UNSPECIFIED: ICD-10-CM

## 2019-04-25 DIAGNOSIS — J45.909 UNSPECIFIED ASTHMA, UNCOMPLICATED: ICD-10-CM

## 2019-04-25 DIAGNOSIS — R07.89 OTHER CHEST PAIN: ICD-10-CM

## 2019-04-25 DIAGNOSIS — Z90.49 ACQUIRED ABSENCE OF OTHER SPECIFIED PARTS OF DIGESTIVE TRACT: Chronic | ICD-10-CM

## 2019-04-25 DIAGNOSIS — K80.50 CALCULUS OF BILE DUCT WITHOUT CHOLANGITIS OR CHOLECYSTITIS WITHOUT OBSTRUCTION: ICD-10-CM

## 2019-04-25 LAB
ALBUMIN SERPL ELPH-MCNC: 3.9 G/DL — SIGNIFICANT CHANGE UP (ref 3.3–5)
ALP SERPL-CCNC: 174 U/L — HIGH (ref 40–120)
ALT FLD-CCNC: 121 U/L — HIGH (ref 4–33)
ANION GAP SERPL CALC-SCNC: 16 MMO/L — HIGH (ref 7–14)
APTT BLD: 26.6 SEC — LOW (ref 27.5–36.3)
AST SERPL-CCNC: 109 U/L — HIGH (ref 4–32)
BASE EXCESS BLDV CALC-SCNC: -0.3 MMOL/L — SIGNIFICANT CHANGE UP
BASOPHILS # BLD AUTO: 0.02 K/UL — SIGNIFICANT CHANGE UP (ref 0–0.2)
BASOPHILS NFR BLD AUTO: 0.2 % — SIGNIFICANT CHANGE UP (ref 0–2)
BILIRUB SERPL-MCNC: 4.6 MG/DL — HIGH (ref 0.2–1.2)
BLOOD GAS VENOUS - CREATININE: 0.43 MG/DL — LOW (ref 0.5–1.3)
BUN SERPL-MCNC: 6 MG/DL — LOW (ref 7–23)
CALCIUM SERPL-MCNC: 9.1 MG/DL — SIGNIFICANT CHANGE UP (ref 8.4–10.5)
CHLORIDE BLDV-SCNC: 111 MMOL/L — HIGH (ref 96–108)
CHLORIDE SERPL-SCNC: 99 MMOL/L — SIGNIFICANT CHANGE UP (ref 98–107)
CO2 SERPL-SCNC: 22 MMOL/L — SIGNIFICANT CHANGE UP (ref 22–31)
CREAT SERPL-MCNC: 0.58 MG/DL — SIGNIFICANT CHANGE UP (ref 0.5–1.3)
EOSINOPHIL # BLD AUTO: 0.01 K/UL — SIGNIFICANT CHANGE UP (ref 0–0.5)
EOSINOPHIL NFR BLD AUTO: 0.1 % — SIGNIFICANT CHANGE UP (ref 0–6)
GAS PNL BLDV: 137 MMOL/L — SIGNIFICANT CHANGE UP (ref 136–146)
GLUCOSE BLDV-MCNC: 248 — HIGH (ref 70–99)
GLUCOSE SERPL-MCNC: 315 MG/DL — HIGH (ref 70–99)
HCG SERPL-ACNC: < 5 MIU/ML — SIGNIFICANT CHANGE UP
HCO3 BLDV-SCNC: 22 MMOL/L — SIGNIFICANT CHANGE UP (ref 20–27)
HCT VFR BLD CALC: 38.9 % — SIGNIFICANT CHANGE UP (ref 34.5–45)
HCT VFR BLDV CALC: 37.3 % — SIGNIFICANT CHANGE UP (ref 34.5–45)
HGB BLD-MCNC: 12.9 G/DL — SIGNIFICANT CHANGE UP (ref 11.5–15.5)
HGB BLDV-MCNC: 12.1 G/DL — SIGNIFICANT CHANGE UP (ref 11.5–15.5)
IMM GRANULOCYTES NFR BLD AUTO: 0.7 % — SIGNIFICANT CHANGE UP (ref 0–1.5)
INR BLD: 1.03 — SIGNIFICANT CHANGE UP (ref 0.88–1.17)
LACTATE BLDV-MCNC: 1.7 MMOL/L — SIGNIFICANT CHANGE UP (ref 0.5–2)
LIDOCAIN IGE QN: > 600 U/L — HIGH (ref 7–60)
LYMPHOCYTES # BLD AUTO: 0.56 K/UL — LOW (ref 1–3.3)
LYMPHOCYTES # BLD AUTO: 6.1 % — LOW (ref 13–44)
MAGNESIUM SERPL-MCNC: 2.1 MG/DL — SIGNIFICANT CHANGE UP (ref 1.6–2.6)
MCHC RBC-ENTMCNC: 32.2 PG — SIGNIFICANT CHANGE UP (ref 27–34)
MCHC RBC-ENTMCNC: 33.2 % — SIGNIFICANT CHANGE UP (ref 32–36)
MCV RBC AUTO: 97 FL — SIGNIFICANT CHANGE UP (ref 80–100)
MONOCYTES # BLD AUTO: 0.41 K/UL — SIGNIFICANT CHANGE UP (ref 0–0.9)
MONOCYTES NFR BLD AUTO: 4.5 % — SIGNIFICANT CHANGE UP (ref 2–14)
NEUTROPHILS # BLD AUTO: 8.12 K/UL — HIGH (ref 1.8–7.4)
NEUTROPHILS NFR BLD AUTO: 88.4 % — HIGH (ref 43–77)
NRBC # FLD: 0 K/UL — SIGNIFICANT CHANGE UP (ref 0–0)
PCO2 BLDV: 50 MMHG — SIGNIFICANT CHANGE UP (ref 41–51)
PH BLDV: 7.32 PH — SIGNIFICANT CHANGE UP (ref 7.32–7.43)
PHOSPHATE SERPL-MCNC: 2.2 MG/DL — LOW (ref 2.5–4.5)
PLATELET # BLD AUTO: 219 K/UL — SIGNIFICANT CHANGE UP (ref 150–400)
PMV BLD: 11.4 FL — SIGNIFICANT CHANGE UP (ref 7–13)
PO2 BLDV: 25 MMHG — LOW (ref 35–40)
POTASSIUM BLDV-SCNC: 3.9 MMOL/L — SIGNIFICANT CHANGE UP (ref 3.4–4.5)
POTASSIUM SERPL-MCNC: 3.4 MMOL/L — LOW (ref 3.5–5.3)
POTASSIUM SERPL-SCNC: 3.4 MMOL/L — LOW (ref 3.5–5.3)
PROT SERPL-MCNC: 7.1 G/DL — SIGNIFICANT CHANGE UP (ref 6–8.3)
PROTHROM AB SERPL-ACNC: 11.4 SEC — SIGNIFICANT CHANGE UP (ref 9.8–13.1)
RBC # BLD: 4.01 M/UL — SIGNIFICANT CHANGE UP (ref 3.8–5.2)
RBC # FLD: 14.1 % — SIGNIFICANT CHANGE UP (ref 10.3–14.5)
SAO2 % BLDV: 36 % — LOW (ref 60–85)
SODIUM SERPL-SCNC: 137 MMOL/L — SIGNIFICANT CHANGE UP (ref 135–145)
TROPONIN T, HIGH SENSITIVITY: < 6 NG/L — SIGNIFICANT CHANGE UP (ref ?–14)
WBC # BLD: 9.18 K/UL — SIGNIFICANT CHANGE UP (ref 3.8–10.5)
WBC # FLD AUTO: 9.18 K/UL — SIGNIFICANT CHANGE UP (ref 3.8–10.5)

## 2019-04-25 PROCEDURE — 99223 1ST HOSP IP/OBS HIGH 75: CPT | Mod: GC

## 2019-04-25 PROCEDURE — 74174 CTA ABD&PLVS W/CONTRAST: CPT | Mod: 26

## 2019-04-25 PROCEDURE — 71046 X-RAY EXAM CHEST 2 VIEWS: CPT | Mod: 26

## 2019-04-25 PROCEDURE — 71275 CT ANGIOGRAPHY CHEST: CPT | Mod: 26

## 2019-04-25 RX ORDER — ACETAMINOPHEN 500 MG
2 TABLET ORAL
Qty: 0 | Refills: 0 | COMMUNITY

## 2019-04-25 RX ORDER — SODIUM CHLORIDE 9 MG/ML
1000 INJECTION, SOLUTION INTRAVENOUS
Qty: 0 | Refills: 0 | Status: DISCONTINUED | OUTPATIENT
Start: 2019-04-25 | End: 2019-04-26

## 2019-04-25 RX ORDER — MORPHINE SULFATE 50 MG/1
4 CAPSULE, EXTENDED RELEASE ORAL ONCE
Qty: 0 | Refills: 0 | Status: DISCONTINUED | OUTPATIENT
Start: 2019-04-25 | End: 2019-04-25

## 2019-04-25 RX ORDER — SODIUM CHLORIDE 9 MG/ML
2000 INJECTION INTRAMUSCULAR; INTRAVENOUS; SUBCUTANEOUS ONCE
Qty: 0 | Refills: 0 | Status: COMPLETED | OUTPATIENT
Start: 2019-04-25 | End: 2019-04-25

## 2019-04-25 RX ORDER — ALBUTEROL 90 UG/1
2 AEROSOL, METERED ORAL EVERY 6 HOURS
Qty: 0 | Refills: 0 | Status: DISCONTINUED | OUTPATIENT
Start: 2019-04-25 | End: 2019-04-30

## 2019-04-25 RX ORDER — POTASSIUM PHOSPHATE, MONOBASIC POTASSIUM PHOSPHATE, DIBASIC 236; 224 MG/ML; MG/ML
15 INJECTION, SOLUTION INTRAVENOUS ONCE
Qty: 0 | Refills: 0 | Status: COMPLETED | OUTPATIENT
Start: 2019-04-25 | End: 2019-04-25

## 2019-04-25 RX ORDER — ALBUTEROL 90 UG/1
2 AEROSOL, METERED ORAL
Qty: 0 | Refills: 0 | COMMUNITY

## 2019-04-25 RX ORDER — ONDANSETRON 8 MG/1
4 TABLET, FILM COATED ORAL ONCE
Qty: 0 | Refills: 0 | Status: COMPLETED | OUTPATIENT
Start: 2019-04-25 | End: 2019-04-25

## 2019-04-25 RX ORDER — SODIUM CHLORIDE 9 MG/ML
1000 INJECTION INTRAMUSCULAR; INTRAVENOUS; SUBCUTANEOUS ONCE
Qty: 0 | Refills: 0 | Status: COMPLETED | OUTPATIENT
Start: 2019-04-25 | End: 2019-04-25

## 2019-04-25 RX ORDER — HYDROMORPHONE HYDROCHLORIDE 2 MG/ML
1 INJECTION INTRAMUSCULAR; INTRAVENOUS; SUBCUTANEOUS EVERY 4 HOURS
Qty: 0 | Refills: 0 | Status: DISCONTINUED | OUTPATIENT
Start: 2019-04-25 | End: 2019-04-30

## 2019-04-25 RX ORDER — ENOXAPARIN SODIUM 100 MG/ML
40 INJECTION SUBCUTANEOUS EVERY 24 HOURS
Qty: 0 | Refills: 0 | Status: DISCONTINUED | OUTPATIENT
Start: 2019-04-25 | End: 2019-04-30

## 2019-04-25 RX ORDER — HYDROMORPHONE HYDROCHLORIDE 2 MG/ML
1 INJECTION INTRAMUSCULAR; INTRAVENOUS; SUBCUTANEOUS ONCE
Qty: 0 | Refills: 0 | Status: DISCONTINUED | OUTPATIENT
Start: 2019-04-25 | End: 2019-04-25

## 2019-04-25 RX ORDER — POTASSIUM CHLORIDE 20 MEQ
40 PACKET (EA) ORAL ONCE
Qty: 0 | Refills: 0 | Status: COMPLETED | OUTPATIENT
Start: 2019-04-25 | End: 2019-04-25

## 2019-04-25 RX ORDER — ONDANSETRON 8 MG/1
4 TABLET, FILM COATED ORAL EVERY 6 HOURS
Qty: 0 | Refills: 0 | Status: DISCONTINUED | OUTPATIENT
Start: 2019-04-25 | End: 2019-04-30

## 2019-04-25 RX ORDER — FAMOTIDINE 10 MG/ML
20 INJECTION INTRAVENOUS ONCE
Qty: 0 | Refills: 0 | Status: COMPLETED | OUTPATIENT
Start: 2019-04-25 | End: 2019-04-25

## 2019-04-25 RX ADMIN — Medication 40 MILLIEQUIVALENT(S): at 17:44

## 2019-04-25 RX ADMIN — SODIUM CHLORIDE 2000 MILLILITER(S): 9 INJECTION INTRAMUSCULAR; INTRAVENOUS; SUBCUTANEOUS at 17:44

## 2019-04-25 RX ADMIN — SODIUM CHLORIDE 1000 MILLILITER(S): 9 INJECTION INTRAMUSCULAR; INTRAVENOUS; SUBCUTANEOUS at 14:06

## 2019-04-25 RX ADMIN — HYDROMORPHONE HYDROCHLORIDE 1 MILLIGRAM(S): 2 INJECTION INTRAMUSCULAR; INTRAVENOUS; SUBCUTANEOUS at 17:43

## 2019-04-25 RX ADMIN — SODIUM CHLORIDE 2000 MILLILITER(S): 9 INJECTION INTRAMUSCULAR; INTRAVENOUS; SUBCUTANEOUS at 16:17

## 2019-04-25 RX ADMIN — SODIUM CHLORIDE 150 MILLILITER(S): 9 INJECTION, SOLUTION INTRAVENOUS at 22:07

## 2019-04-25 RX ADMIN — POTASSIUM PHOSPHATE, MONOBASIC POTASSIUM PHOSPHATE, DIBASIC 62.5 MILLIMOLE(S): 236; 224 INJECTION, SOLUTION INTRAVENOUS at 20:46

## 2019-04-25 RX ADMIN — ONDANSETRON 4 MILLIGRAM(S): 8 TABLET, FILM COATED ORAL at 14:06

## 2019-04-25 RX ADMIN — MORPHINE SULFATE 4 MILLIGRAM(S): 50 CAPSULE, EXTENDED RELEASE ORAL at 16:44

## 2019-04-25 RX ADMIN — MORPHINE SULFATE 4 MILLIGRAM(S): 50 CAPSULE, EXTENDED RELEASE ORAL at 14:06

## 2019-04-25 RX ADMIN — FAMOTIDINE 20 MILLIGRAM(S): 10 INJECTION INTRAVENOUS at 14:06

## 2019-04-25 NOTE — H&P ADULT - PROBLEM SELECTOR PLAN 3
BMI 37  -check lipid profile and A1c Low suspicion ACS for L chest pain in patient with no risk factors. trop neg, EKG wnl. CTA chest neg for PE, dissection, or other pulmonary process

## 2019-04-25 NOTE — H&P ADULT - NSHPREVIEWOFSYSTEMS_GEN_ALL_CORE
CONSTITUTIONAL: see HPI  EYES/ENT: No visual changes;  No dysphagia  NECK: No pain or stiffness  RESPIRATORY: No cough, wheezing, hemoptysis; +SOB due to pain  CARDIOVASCULAR: No palpitations; No lower extremity edema. +L CP  GASTROINTESTINAL: see HPI  GENITOURINARY: No dysuria, frequency or hematuria  NEUROLOGICAL: No numbness or weakness  HEMATOLOGY: No easy bleeding, no lymphadenopathy  SKIN: No itching, burning, rashes, or lesions   All other review of systems is negative unless indicated above.

## 2019-04-25 NOTE — ED PROVIDER NOTE - CLINICAL SUMMARY MEDICAL DECISION MAKING FREE TEXT BOX
35yo female with LUQ pain and vomiting, diarrhea, likely gastroenteritis vs gastritis vs pancreatitis, hyperglycemia concerning for DKA, will check labs, ekg, cxr, ct a/p, give GI cocktail, analgesia, reassess. Vanessa Walker DO 33yo female with LUQ pain and vomiting, diarrhea, likely gastroenteritis vs gastritis vs pancreatitis, hyperglycemia concerning for DKA, will check labs, ekg, cxr, ct a/p, give GI cocktail, analgesia, reassess. Vanessa Heredia: pain on lt. hx of gastritis, vomiting, tender abd. gallbladder out. moving bowels. Will ct abd as pain is intense.

## 2019-04-25 NOTE — H&P ADULT - NSHPPHYSICALEXAM_GEN_ALL_CORE
Vital Signs Last 24 Hrs  T(C): 36.9 (25 Apr 2019 19:04), Max: 37.1 (25 Apr 2019 16:50)  T(F): 98.5 (25 Apr 2019 19:04), Max: 98.8 (25 Apr 2019 16:50)  HR: 90 (25 Apr 2019 19:04) (74 - 91)  BP: 130/87 (25 Apr 2019 19:04) (98/42 - 130/87)  BP(mean): --  RR: 17 (25 Apr 2019 19:04) (17 - 20)  SpO2: 98% (25 Apr 2019 19:04) (95% - 100%)    GENERAL: well-developed, +distress due to abd pain  HEAD:  Atraumatic, Normocephalic  ENT: PERRL, conjunctiva and sclera clear, neck supple, no JVD, moist mucosa, posterior oropharynx clear, +sublingual jaundice  CHEST/LUNG: Clear to auscultation bilaterally but poor inspiratory effort 2/2 pain; No wheeze, equal breath sounds bilaterally, respirations nonlabored, on RA  HEART: Regular rate and rhythm; No murmurs, rubs, or gallops  ABDOMEN: Soft, nondistended, +L sided tenderness greatest in LUQ, no guarding or rebound tenderness; Bowel sounds present, no organomegaly  BACK: no spinal tenderness, no CVA tenderness  EXTREMITIES:  No clubbing, cyanosis, or edema  PSYCH: Nl behavior, nl affect  NEUROLOGY: AAOx3, non-focal, moves all extremities spontaneously  SKIN: Normal color, No rashes or lesions

## 2019-04-25 NOTE — H&P ADULT - PROBLEM SELECTOR PLAN 1
Acute pancreatitis with CBD dilation to 2cm and intrahepatic ductal dilation with choledocolithiasis on imaging. Pt denies etoh, hx pancreatitis. No fluid collections on imaging. Pt is hemodynamically stable  -Pain control: dilaudid 1mg IV q4 mod-severe pain  -s/p 4L NS, start LR @150cc/hr x 18hr  -strict I/Os  -zofran prn, monitor QTc  -GI consult emailed  -FLD, NPO at [facility] for ERCP  -f/u lipid profile Acute pancreatitis with CBD dilation to 2cm and intrahepatic ductal dilation with choledocolithiasis on imaging. Pt denies etoh, hx pancreatitis. Calcium wnl. No fluid collections on imaging, afebrile, no leukoctysosis; no signs of infection currently. Pt is hemodynamically stable  -Pain control: dilaudid 1mg IV q4 mod-severe pain  -no sign pancreatic necrosis, monitor off abx  -s/p 4L NS, start LR @150cc/hr x 18hr  -strict I/Os, monitor LFTs  -zofran prn, monitor QTc  -GI consult emailed  -FLD, NPO at [facility] for ERCP  -f/u lipid profile Acute pancreatitis with CBD dilation to 2cm and intrahepatic ductal dilation with choledocolithiasis on imaging. Serum lipase >600. Pt denies etoh, hx pancreatitis. Calcium wnl. No fluid collections on imaging, afebrile, no leukoctysosis; no signs of infection currently. Pt is hemodynamically stable  -Pain control: dilaudid 1mg IV q4 mod-severe pain  -no sign pancreatic necrosis, monitor off abx  -s/p 4L NS, start LR @150cc/hr x 18hr  -strict I/Os, monitor LFTs  -zofran prn, monitor QTc  -GI consult emailed  -FLD, NPO at [facility] for ERCP  -f/u lipid profile Acute pancreatitis with CBD dilation to 2cm caused by 4mm CBD stone and intrahepatic ductal dilation with choledocolithiasis on imaging and obstructive pattern LFT abnormalities. Serum lipase >600. Pt denies etoh, hx pancreatitis. Calcium wnl. No fluid collections on imaging, afebrile, no leukoctysosis; no signs of infection currently. Pt is hemodynamically stable  -Pain control: dilaudid 1mg IV q4 mod-severe pain  -no sign pancreatic necrosis, monitor off abx  -s/p 4L NS, start LR @150cc/hr x 18hr  -strict I/Os, monitor LFTs  -zofran prn, monitor QTc  -GI consult emailed  -FLD, NPO at [facility] for ERCP  -f/u lipid profile Acute pancreatitis with CBD dilation to 2cm caused by 4mm CBD stone and intrahepatic ductal dilation with choledocolithiasis on imaging and obstructive pattern LFT abnormalities with TB 4.6. Serum lipase >600. Pt denies etoh, hx pancreatitis. Calcium wnl. No fluid collections on imaging, afebrile, no leukoctysosis; no signs of infection currently. Pt is hemodynamically stable  -Pain control: dilaudid 1mg IV q4 mod-severe pain  -no sign pancreatic necrosis, monitor off abx  -s/p 4L NS, start LR @150cc/hr x 18hr  -strict I/Os, monitor LFTs  -zofran prn, monitor QTc  -GI consult emailed  -FLD, NPO at [facility] for ERCP  -f/u lipid profile Acute pancreatitis with CBD dilation to 2cm caused by 4mm CBD stone and intrahepatic ductal dilation with choledocholithiasis on imaging and obstructive pattern LFT abnormalities with TB 4.6. Serum lipase >600. Pt denies etoh, hx pancreatitis. Calcium wnl. No fluid collections on imaging, afebrile, no leukoctysosis; no signs of infection currently. Pt is hemodynamically stable  -Pain control: dilaudid 1mg IV q4 mod-severe pain  -no sign pancreatic necrosis, monitor off abx  -s/p 4L NS, start LR @150cc/hr x 18hr  -strict I/Os, monitor LFTs  -Zofran prn, monitor QTc  -GI consult requested  -FLD, NPO at [facility] for ERCP  -f/u lipid profile Acute pancreatitis with CBD dilation to 2cm and intrahepatic ductal dilation with 4mm CBD stone on imaging and obstructive pattern LFT abnormalities with TB 4.6. Serum lipase >600. Pt denies etoh, hx pancreatitis. Calcium wnl. No fluid collections on imaging, afebrile, no leukoctysosis; no signs of infection currently. Pt is hemodynamically stable  -Pain control: dilaudid 1mg IV q4 mod-severe pain  -no sign pancreatic necrosis, monitor off abx  -s/p 4L NS, start LR @150cc/hr x 18hr  -strict I/Os, monitor LFTs  -Zofran prn, monitor QTc  -GI consult requested  -FLD, NPO at [facility] for ERCP  -f/u lipid profile

## 2019-04-25 NOTE — H&P ADULT - NSICDXPASTMEDICALHX_GEN_ALL_CORE_FT
PAST MEDICAL HISTORY:  Asthma     Calculus of gallbladder with chronic cholecystitis without obstruction     Fatty liver     Gastritis     Obesity BMI 37

## 2019-04-25 NOTE — ED PROVIDER NOTE - OBJECTIVE STATEMENT
35yo female PMH asthma, gastritis, gallstones, presenting with LUQ abdominal pain x 1 day, severe, "feels like something is going to burst inside," worse with inspiration. +nausea, vomiting x3 nonbloody, +Diarrhea (nonbloody). Patient had similar pain 1 month ago which last 1 week and then went away. Tried taking Tylenol without relief.     History obtained using  ID #[id number]

## 2019-04-25 NOTE — H&P ADULT - LYMPHATIC
anterior cervical R/supraclavicular R/supraclavicular L/posterior cervical L/posterior cervical R/anterior cervical L

## 2019-04-25 NOTE — ED ADULT NURSE NOTE - OBJECTIVE STATEMENT
Patient reports sudden onset CP. Patient's EKG shows NSR. Patient placed on continuous monitor showing no acute changes. Patient's labs drawn, #18g placed into left AC. Patient pending further evaluation by MD.

## 2019-04-25 NOTE — H&P ADULT - ASSESSMENT
33yo F with hx gallstones s/p cholecystectomy (7/2018), gastritis, fatty liver, asthma presents with acute gallstone pancreatitis. 35yo Korean-speaking F with hx gallstones s/p cholecystectomy (7/2018), gastritis, fatty liver, asthma presents with acute gallstone pancreatitis.

## 2019-04-25 NOTE — ED ADULT NURSE NOTE - NSIMPLEMENTINTERV_GEN_ALL_ED
Implemented All Universal Safety Interventions:  [place] to call system. Call bell, personal items and telephone within reach. Instruct patient to call for assistance. Room bathroom lighting operational. Non-slip footwear when patient is off stretcher. Physically safe environment: no spills, clutter or unnecessary equipment. Stretcher in lowest position, wheels locked, appropriate side rails in place.

## 2019-04-25 NOTE — PATIENT PROFILE ADULT - NSPROPOAPRESSUREINJURY_GEN_A_NUR
Problem: Suicide/Homicide (Adult/Pediatric)  Goal: *STG: Remains safe in hospital  Patient to remain safe every day while hospitalized. Outcome: Progressing Towards Goal  Patient contracted for safety this shift  Goal: *STG: Attends activities and groups  Patient to attend 2-3 group therapy every day while hospitalized. Outcome: Progressing Towards Goal  Patient participated in group and unit activities      Problem: Aggression and Hostility (Behavioral Health)  Goal: *Reduce number of physically combative episodes  Patient to reduce number of physically combative episodes every day while hospitalized. Outcome: Progressing Towards Goal  No aggressive behavior observed or displayed this shift    Patient cooperative and calm, contracted for safety. Patient stated that her day has been fine and that she was able to speak with the  and the SW discussed at length about past event in her life and learning to forgive. Patient stated that she was not in the mood to talk but she kind of get what the SW was trying to say and will think about it. Patient participated in group and unit activities, interacted with peers and remained on task through the shift. Patient completed her hygiene, received medications as prescribed and remained free of falls.  Will continue to monitor for safety and support no

## 2019-04-25 NOTE — H&P ADULT - HISTORY OF PRESENT ILLNESS
35yo F with hx gallstones s/p cholecystectomy (7/2018), gastritis, fatty liver, asthma presents with abdominal pain and vomiting. Pt developed severe LUQ abd pain that radiates to her left chest and left neck. It is worse with twisting motion and taking a deep breath. She ate some potatoes, but vomited it up. Emesis looked like water and the rim was the color of coffee, no blood or bile. She has vomited 9 times and was able to keep down juice once she got nausea medication. Starting this morning she had 2 liquid stools. Pt endorses chills at home, difficulty breathing due to pain, generalized weakness, and abdominal distension. Has been taking Motrin for the pain. She had similar LUQ pain a month ago but it was less severe and lasted a few hours. Pt had biliary colic of epigastric pain that radiated to the back and had her gallbladder removed last year. Denies etoh, previous hx pancreatitis. Denies sweats, hematemesis, wheezing, yellowing of skin/eyes, hematochezia, melena.    ED vitals afebrile, BP low 98/42 --> 122/73. Received pepcid, zofran, KCl 40mEq po, morphine 4mg IV x 2, dilaudid 1mg IV, 4L NS. 35yo F with hx gallstones s/p cholecystectomy (7/2018), gastritis, fatty liver, asthma presents with abdominal pain and vomiting. Pt developed severe LUQ abd pain that radiates to her left chest and left neck. It is worse with twisting motion and taking a deep breath. She ate some potatoes, but vomited it up. Emesis looked like water and the rim was the color of coffee, no blood or bile. She has vomited 9 times and was able to keep down juice once she got nausea medication. Starting this morning she had 2 liquid stools. Pt endorses chills at home, difficulty breathing due to pain, generalized weakness, and abdominal distension. Has been taking Motrin for the pain. She had similar LUQ pain a month ago and came to [facility] ED, but it was less severe and lasted a few hours, lipase 21. Pt had biliary colic of epigastric pain that radiated to the back and had her gallbladder removed last year. Denies etoh, previous hx pancreatitis. Denies sweats, hematemesis, wheezing, yellowing of skin/eyes, hematochezia, melena.    ED vitals afebrile, BP low 98/42 --> 122/73. Received pepcid, zofran, KCl 40mEq po, morphine 4mg IV x 2, dilaudid 1mg IV, 4L NS. 33yo F with hx gallstones s/p cholecystectomy (7/2018), gastritis, fatty liver, asthma presents with abdominal pain and vomiting. Pt developed severe LUQ abd pain that radiates to her left chest and left neck. It is worse with twisting motion and taking a deep breath. She ate some potatoes, but vomited it up. Emesis looked like water and the rim was the color of coffee, no blood or bile. She has vomited 9 times and was able to keep down juice once she got nausea medication. Starting this morning she had 2 liquid stools. Pt endorses chills at home, difficulty breathing due to pain, generalized weakness, and abdominal distension. Has been taking Motrin for the pain. She had similar LUQ pain a month ago and came to [facility] ED, but it was less severe and lasted a few hours, lipase 21. Pt had biliary colic of epigastric pain that radiated to the back and had her gallbladder removed last year. Denies etoh, previous hx pancreatitis. Endorses itching. Denies sweats, hematemesis, wheezing, yellowing of skin/eyes, hematochezia, melena.    ED vitals afebrile, BP low 98/42 --> 122/73. Received pepcid, zofran, KCl 40mEq po, morphine 4mg IV x 2, dilaudid 1mg IV, 4L NS. 35yo Czech-speaking F with hx gallstones s/p cholecystectomy (7/2018), gastritis, fatty liver, asthma presents with abdominal pain and vomiting. Pt developed severe LUQ abd pain that radiates to her left chest and left neck. It is worse with twisting motion and taking a deep breath. She ate some potatoes, but vomited it up. Emesis looked like water and the rim was the color of coffee, no blood or bile. She has vomited 9 times and was able to keep down juice once she got nausea medication. Starting this morning she had 2 liquid stools. Pt endorses chills at home, difficulty breathing due to pain, generalized weakness, and abdominal distension. Has been taking Motrin for the pain. She had similar LUQ pain a month ago and came to [facility] ED, but it was less severe and lasted a few hours, lipase 21. Pt had biliary colic of epigastric pain that radiated to the back and had her gallbladder removed last year. Denies etoh, previous hx pancreatitis. Endorses itching. Denies sweats, hematemesis, wheezing, yellowing of skin/eyes, hematochezia, melena.    ED vitals afebrile, BP low 98/42 --> 122/73. Received pepcid, zofran, KCl 40mEq po, morphine 4mg IV x 2, dilaudid 1mg IV, 4L NS. 33yo Croatian-speaking Female with hx gallstones s/p cholecystectomy (7/2018), gastritis, fatty liver, asthma presents with abdominal pain and vomiting. Pt developed severe LUQ abd pain that radiates to her left chest and left neck. It is worse with twisting motion and taking a deep breath. She ate some potatoes, but vomited it up. Emesis looked like water and the rim was the color of coffee, no blood or bile. She has vomited 9 times and was able to keep down juice once she got nausea medication. Starting this morning she had 2 liquid stools. Pt endorses chills at home, difficulty breathing due to pain, generalized weakness, and abdominal distension. Has been taking Motrin for the pain. She had similar LUQ pain a month ago and came to [facility] ED, but it was less severe and lasted a few hours, lipase 21. Pt had biliary colic of epigastric pain that radiated to the back and had her gallbladder removed last year. Denies etoh, previous hx pancreatitis. Endorses itching. Denies sweats, hematemesis, wheezing, yellowing of skin/eyes, hematochezia, melena.    ED vitals afebrile, BP low 98/42 --> 122/73. Received pepcid, zofran, KCl 40mEq po, morphine 4mg IV x 2, dilaudid 1mg IV, 4L NS.

## 2019-04-25 NOTE — ED PROVIDER NOTE - ATTENDING CONTRIBUTION TO CARE
I performed a history and physical exam of the patient and discussed their management with the resident and /or advanced care provider. I reviewed the resident and /or ACP's note and agree with the documented findings and plan of care. My medical decison making and observations are found above.  Abd lt side pain>rt side but tender everywhere. lungs clear. abd >chest pain

## 2019-04-25 NOTE — ED ADULT NURSE REASSESSMENT NOTE - NS ED NURSE REASSESS COMMENT FT1
Received report from JOAQUINA Chin. Pt [id number]. C/o ABD pain, Dr. Licona made aware. Will go evaluate pt at this time. Awaiting orders, will monitor.

## 2019-04-25 NOTE — ED PROVIDER NOTE - PHYSICAL EXAMINATION
Gen: uncomfortable appearing, Nontoxic  Head: NCAT  Lung: CTAB, no respiratory distress, no wheezing, rales, rhonchi  CV: normal s1/s2, rrr, no murmurs, Normal perfusion  Abd: soft, nondistended +TTP LUQ, no guarding/rigidity  MSK: No edema, no visible deformities, full range of motion in all 4 extremities  Neuro: No focal neurologic deficits  Skin: No rash

## 2019-04-25 NOTE — H&P ADULT - PROBLEM SELECTOR PLAN 2
Pt is s/p cholecystectomy but retained stones led to obstruction.  -ERCP Pt is s/p cholecystectomy but retained stones led to obstruction.  -GI c/s for ERCP

## 2019-04-26 LAB
ALBUMIN SERPL ELPH-MCNC: 3.3 G/DL — SIGNIFICANT CHANGE UP (ref 3.3–5)
ALP SERPL-CCNC: 146 U/L — HIGH (ref 40–120)
ALT FLD-CCNC: 87 U/L — HIGH (ref 4–33)
ANION GAP SERPL CALC-SCNC: 13 MMO/L — SIGNIFICANT CHANGE UP (ref 7–14)
APPEARANCE UR: SIGNIFICANT CHANGE UP
AST SERPL-CCNC: 70 U/L — HIGH (ref 4–32)
BACTERIA # UR AUTO: SIGNIFICANT CHANGE UP
BASOPHILS # BLD AUTO: 0.02 K/UL — SIGNIFICANT CHANGE UP (ref 0–0.2)
BASOPHILS NFR BLD AUTO: 0.2 % — SIGNIFICANT CHANGE UP (ref 0–2)
BILIRUB SERPL-MCNC: 2.7 MG/DL — HIGH (ref 0.2–1.2)
BILIRUB UR-MCNC: HIGH
BLD GP AB SCN SERPL QL: NEGATIVE — SIGNIFICANT CHANGE UP
BLOOD UR QL VISUAL: SIGNIFICANT CHANGE UP
BUN SERPL-MCNC: 5 MG/DL — LOW (ref 7–23)
CALCIUM SERPL-MCNC: 8.1 MG/DL — LOW (ref 8.4–10.5)
CHLORIDE SERPL-SCNC: 102 MMOL/L — SIGNIFICANT CHANGE UP (ref 98–107)
CHOLEST SERPL-MCNC: 219 MG/DL — HIGH (ref 120–199)
CO2 SERPL-SCNC: 22 MMOL/L — SIGNIFICANT CHANGE UP (ref 22–31)
COLOR SPEC: SIGNIFICANT CHANGE UP
CREAT SERPL-MCNC: 0.53 MG/DL — SIGNIFICANT CHANGE UP (ref 0.5–1.3)
EOSINOPHIL # BLD AUTO: 0.01 K/UL — SIGNIFICANT CHANGE UP (ref 0–0.5)
EOSINOPHIL NFR BLD AUTO: 0.1 % — SIGNIFICANT CHANGE UP (ref 0–6)
GLUCOSE SERPL-MCNC: 144 MG/DL — HIGH (ref 70–99)
GLUCOSE UR-MCNC: 150 — HIGH
HBA1C BLD-MCNC: 6.3 % — HIGH (ref 4–5.6)
HCT VFR BLD CALC: 33.4 % — LOW (ref 34.5–45)
HDLC SERPL-MCNC: 40 MG/DL — LOW (ref 45–65)
HGB BLD-MCNC: 11.2 G/DL — LOW (ref 11.5–15.5)
HYALINE CASTS # UR AUTO: NEGATIVE — SIGNIFICANT CHANGE UP
IMM GRANULOCYTES NFR BLD AUTO: 0.6 % — SIGNIFICANT CHANGE UP (ref 0–1.5)
INR BLD: 1.17 — SIGNIFICANT CHANGE UP (ref 0.88–1.17)
KETONES UR-MCNC: NEGATIVE — SIGNIFICANT CHANGE UP
LEUKOCYTE ESTERASE UR-ACNC: NEGATIVE — SIGNIFICANT CHANGE UP
LIPID PNL WITH DIRECT LDL SERPL: 144 MG/DL — SIGNIFICANT CHANGE UP
LYMPHOCYTES # BLD AUTO: 0.86 K/UL — LOW (ref 1–3.3)
LYMPHOCYTES # BLD AUTO: 10 % — LOW (ref 13–44)
MAGNESIUM SERPL-MCNC: 1.7 MG/DL — SIGNIFICANT CHANGE UP (ref 1.6–2.6)
MCHC RBC-ENTMCNC: 32.1 PG — SIGNIFICANT CHANGE UP (ref 27–34)
MCHC RBC-ENTMCNC: 33.5 % — SIGNIFICANT CHANGE UP (ref 32–36)
MCV RBC AUTO: 95.7 FL — SIGNIFICANT CHANGE UP (ref 80–100)
MONOCYTES # BLD AUTO: 0.38 K/UL — SIGNIFICANT CHANGE UP (ref 0–0.9)
MONOCYTES NFR BLD AUTO: 4.4 % — SIGNIFICANT CHANGE UP (ref 2–14)
NEUTROPHILS # BLD AUTO: 7.26 K/UL — SIGNIFICANT CHANGE UP (ref 1.8–7.4)
NEUTROPHILS NFR BLD AUTO: 84.7 % — HIGH (ref 43–77)
NITRITE UR-MCNC: NEGATIVE — SIGNIFICANT CHANGE UP
NRBC # FLD: 0 K/UL — SIGNIFICANT CHANGE UP (ref 0–0)
PH UR: 6.5 — SIGNIFICANT CHANGE UP (ref 5–8)
PHOSPHATE SERPL-MCNC: 2.7 MG/DL — SIGNIFICANT CHANGE UP (ref 2.5–4.5)
PLATELET # BLD AUTO: 174 K/UL — SIGNIFICANT CHANGE UP (ref 150–400)
PMV BLD: 11 FL — SIGNIFICANT CHANGE UP (ref 7–13)
POTASSIUM SERPL-MCNC: 3.3 MMOL/L — LOW (ref 3.5–5.3)
POTASSIUM SERPL-SCNC: 3.3 MMOL/L — LOW (ref 3.5–5.3)
PROT SERPL-MCNC: 6.1 G/DL — SIGNIFICANT CHANGE UP (ref 6–8.3)
PROT UR-MCNC: 10 — SIGNIFICANT CHANGE UP
PROTHROM AB SERPL-ACNC: 13 SEC — SIGNIFICANT CHANGE UP (ref 9.8–13.1)
RBC # BLD: 3.49 M/UL — LOW (ref 3.8–5.2)
RBC # FLD: 14.8 % — HIGH (ref 10.3–14.5)
RBC CASTS # UR COMP ASSIST: SIGNIFICANT CHANGE UP (ref 0–?)
RH IG SCN BLD-IMP: POSITIVE — SIGNIFICANT CHANGE UP
SODIUM SERPL-SCNC: 137 MMOL/L — SIGNIFICANT CHANGE UP (ref 135–145)
SP GR SPEC: 1.03 — SIGNIFICANT CHANGE UP (ref 1–1.04)
SQUAMOUS # UR AUTO: SIGNIFICANT CHANGE UP
TRIGL SERPL-MCNC: 162 MG/DL — HIGH (ref 10–149)
UROBILINOGEN FLD QL: NORMAL — SIGNIFICANT CHANGE UP
WBC # BLD: 8.58 K/UL — SIGNIFICANT CHANGE UP (ref 3.8–10.5)
WBC # FLD AUTO: 8.58 K/UL — SIGNIFICANT CHANGE UP (ref 3.8–10.5)
WBC UR QL: SIGNIFICANT CHANGE UP (ref 0–?)

## 2019-04-26 PROCEDURE — 93306 TTE W/DOPPLER COMPLETE: CPT | Mod: 26

## 2019-04-26 PROCEDURE — 99223 1ST HOSP IP/OBS HIGH 75: CPT | Mod: GC,25

## 2019-04-26 PROCEDURE — 43262 ENDO CHOLANGIOPANCREATOGRAPH: CPT | Mod: GC,59

## 2019-04-26 PROCEDURE — 74328 X-RAY BILE DUCT ENDOSCOPY: CPT | Mod: 26,GC

## 2019-04-26 PROCEDURE — 99233 SBSQ HOSP IP/OBS HIGH 50: CPT | Mod: GC

## 2019-04-26 PROCEDURE — 43264 ERCP REMOVE DUCT CALCULI: CPT | Mod: GC

## 2019-04-26 RX ORDER — POTASSIUM CHLORIDE 20 MEQ
10 PACKET (EA) ORAL
Qty: 0 | Refills: 0 | Status: COMPLETED | OUTPATIENT
Start: 2019-04-26 | End: 2019-04-26

## 2019-04-26 RX ORDER — SODIUM CHLORIDE 9 MG/ML
1000 INJECTION, SOLUTION INTRAVENOUS
Qty: 0 | Refills: 0 | Status: DISCONTINUED | OUTPATIENT
Start: 2019-04-26 | End: 2019-04-28

## 2019-04-26 RX ORDER — DIPHENHYDRAMINE HCL 50 MG
25 CAPSULE ORAL EVERY 6 HOURS
Qty: 0 | Refills: 0 | Status: DISCONTINUED | OUTPATIENT
Start: 2019-04-26 | End: 2019-04-30

## 2019-04-26 RX ADMIN — SODIUM CHLORIDE 150 MILLILITER(S): 9 INJECTION, SOLUTION INTRAVENOUS at 12:17

## 2019-04-26 RX ADMIN — HYDROMORPHONE HYDROCHLORIDE 1 MILLIGRAM(S): 2 INJECTION INTRAMUSCULAR; INTRAVENOUS; SUBCUTANEOUS at 07:06

## 2019-04-26 RX ADMIN — HYDROMORPHONE HYDROCHLORIDE 1 MILLIGRAM(S): 2 INJECTION INTRAMUSCULAR; INTRAVENOUS; SUBCUTANEOUS at 20:11

## 2019-04-26 RX ADMIN — Medication 100 MILLIEQUIVALENT(S): at 17:29

## 2019-04-26 RX ADMIN — HYDROMORPHONE HYDROCHLORIDE 1 MILLIGRAM(S): 2 INJECTION INTRAMUSCULAR; INTRAVENOUS; SUBCUTANEOUS at 01:56

## 2019-04-26 RX ADMIN — Medication 100 MILLIEQUIVALENT(S): at 18:34

## 2019-04-26 RX ADMIN — HYDROMORPHONE HYDROCHLORIDE 1 MILLIGRAM(S): 2 INJECTION INTRAMUSCULAR; INTRAVENOUS; SUBCUTANEOUS at 12:28

## 2019-04-26 RX ADMIN — ENOXAPARIN SODIUM 40 MILLIGRAM(S): 100 INJECTION SUBCUTANEOUS at 06:39

## 2019-04-26 RX ADMIN — HYDROMORPHONE HYDROCHLORIDE 1 MILLIGRAM(S): 2 INJECTION INTRAMUSCULAR; INTRAVENOUS; SUBCUTANEOUS at 12:42

## 2019-04-26 RX ADMIN — Medication 25 MILLIGRAM(S): at 13:33

## 2019-04-26 RX ADMIN — Medication 100 MILLIEQUIVALENT(S): at 19:53

## 2019-04-26 RX ADMIN — HYDROMORPHONE HYDROCHLORIDE 1 MILLIGRAM(S): 2 INJECTION INTRAMUSCULAR; INTRAVENOUS; SUBCUTANEOUS at 01:38

## 2019-04-26 RX ADMIN — HYDROMORPHONE HYDROCHLORIDE 1 MILLIGRAM(S): 2 INJECTION INTRAMUSCULAR; INTRAVENOUS; SUBCUTANEOUS at 06:39

## 2019-04-26 RX ADMIN — HYDROMORPHONE HYDROCHLORIDE 1 MILLIGRAM(S): 2 INJECTION INTRAMUSCULAR; INTRAVENOUS; SUBCUTANEOUS at 19:53

## 2019-04-26 NOTE — PROGRESS NOTE ADULT - PROBLEM SELECTOR PLAN 4
BMI 37  -check lipid profile and A1c BMI 37. Lipid panel w cholesterol 219, triglycerides 162, HDL 40. UA w glucosuria & ketonuria. A1C 6.3% pre-diabetes.

## 2019-04-26 NOTE — PROGRESS NOTE ADULT - PROBLEM SELECTOR PLAN 2
Pt is s/p cholecystectomy but retained stones led to obstruction.  -GI c/s for ERCP Pt is s/p cholecystectomy but retained stones led to obstruction. Elevated bili, reports pruritis  -NPO for ERCP this morning  -benadryl prn for pruritis

## 2019-04-26 NOTE — PROGRESS NOTE ADULT - SUBJECTIVE AND OBJECTIVE BOX
HPI:    33yo Bulgarian-speaking Female with hx gallstones s/p cholecystectomy (2018), gastritis, fatty liver, asthma presents with abdominal pain and vomiting. Pt developed severe LUQ abd pain that radiates to her left chest and left neck. It is worse with twisting motion and taking a deep breath. She ate some potatoes, but vomited it up. Emesis looked like water and the rim was the color of coffee, no blood or bile. She has vomited 9 times and was able to keep down juice once she got nausea medication. Starting this morning she had 2 liquid stools. Pt endorses chills at home, difficulty breathing due to pain, generalized weakness, and abdominal distension. Has been taking Motrin for the pain. She had similar LUQ pain a month ago and came to [facility] ED, but it was less severe and lasted a few hours, lipase 21. Pt had biliary colic of epigastric pain that radiated to the back and had her gallbladder removed last year. Denies etoh, previous hx pancreatitis. Endorses itching. Denies sweats, hematemesis, wheezing, yellowing of skin/eyes, hematochezia, melena.    ED vitals afebrile, BP low 98/42 --> 122/73. Received pepcid, zofran, KCl 40mEq po, morphine 4mg IV x 2, dilaudid 1mg IV, 4L NS. Found to have gallstone pancreatitis, plan for ERCP this morning.      INTERVAL EVENTS / SUBJECTIVE:    ***      HOSPITAL MEDICATIONS:  ALBUTerol    90 MICROgram(s) HFA Inhaler 2 Puff(s) Inhalation every 6 hours PRN Shortness of Breath and/or Wheezing  enoxaparin Injectable 40 milliGRAM(s) SubCutaneous every 24 hours  HYDROmorphone  Injectable 1 milliGRAM(s) IV Push every 4 hours PRN moderate - severe pain  lactated ringers. 1000 milliLiter(s) IV Continuous <Continuous>  ondansetron Injectable 4 milliGRAM(s) IV Push every 6 hours PRN Nausea and/or Vomiting      OBJECTIVE:  Vital Signs Last 24 Hrs  T(C): 37 (2019 12:14), Max: 37.6 (2019 05:24)  T(F): 98.6 (2019 12:14), Max: 99.6 (2019 05:24)  HR: 82 (2019 12:14) (74 - 91)  BP: 121/78 (2019 12:14) (112/67 - 130/87)  BP(mean): --  RR: 16 (2019 12:14) (16 - 20)  SpO2: 97% (2019 12:14) (95% - 100%)     @ 07:01  -   @ 07:00  --------------------------------------------------------  IN: 551 mL / OUT: 0 mL / NET: 551 mL      CAPILLARY BLOOD GLUCOSE    POCT Blood Glucose.: 156 mg/dL (2019 09:12)        PHYSICAL EXAM:  	GENERAL: well-developed, +distress due to abd pain  	HEAD:  Atraumatic, Normocephalic  	ENT: PERRL, conjunctiva and sclera clear, neck supple, no JVD, moist mucosa, posterior oropharynx clear, +sublingual jaundice  	CHEST/LUNG: Clear to auscultation bilaterally but poor inspiratory effort 2/2 pain; No wheeze, equal breath sounds bilaterally, respirations nonlabored, on RA  	HEART: Regular rate and rhythm; No murmurs, rubs, or gallops  	ABDOMEN: Soft, nondistended, +L sided tenderness greatest in LUQ, no guarding or rebound tenderness; Bowel sounds present, no organomegaly  	BACK: no spinal tenderness, no CVA tenderness  	EXTREMITIES:  No clubbing, cyanosis, or edema  	PSYCH: Nl behavior, nl affect  	NEUROLOGY: AAOx3, non-focal, moves all extremities spontaneously    SKIN: Normal color, No rashes or lesions      LABS:                        11.2   8.58  )-----------( 174      ( 2019 07:00 )             33.4     Hgb Trend: 11.2<--, 12.9<--      137  |  102  |  5<L>  ----------------------------<  144<H>  3.3<L>   |  22  |  0.53    Ca    8.1<L>      2019 07:00  Phos  2.7       Mg     1.7         TPro  6.1  /  Alb  3.3  /  TBili  2.7<H>  /  DBili  x   /  AST  70<H>  /  ALT  87<H>  /  AlkPhos  146<H>      Creatinine Trend: 0.53<--, 0.58<--, 0.71<--  PT/INR - ( 2019 07:00 )   PT: 13.0 SEC;   INR: 1.17       PTT - ( 2019 13:20 )  PTT:26.6 SEC  Urinalysis Basic - ( 2019 01:15 )    Color: DARK YELLOW / Appearance: Lt TURBID / S.028 / pH: 6.5  Gluc: 150 / Ketone: NEGATIVE  / Bili: SMALL / Urobili: NORMAL   Blood: TRACE / Protein: 10 / Nitrite: NEGATIVE   Leuk Esterase: NEGATIVE / RBC: 3-5 / WBC 0-2   Sq Epi: OCC / Non Sq Epi: x / Bacteria: FEW    Venous Blood Gas:   @ 15:30  7.32/50/25/22/36.0  VBG Lactate: 1.7      RADIOLOGY:     CT Angio C/A/P:    No intramural hematoma or dissection    Acute pancreatitis secondary to 4 mm CBD stone. No abscess or pseudocyst HPI:    35yo Divehi-speaking Female with hx gallstones s/p cholecystectomy (2018), gastritis, fatty liver, asthma presents with abdominal pain and vomiting. Pt developed severe LUQ abd pain that radiates to her left chest and left neck. It is worse with twisting motion and taking a deep breath. She ate some potatoes, but vomited it up. Emesis looked like water and the rim was the color of coffee, no blood or bile. She has vomited 9 times and was able to keep down juice once she got nausea medication. Starting this morning she had 2 liquid stools. Pt endorses chills at home, difficulty breathing due to pain, generalized weakness, and abdominal distension. Has been taking Motrin for the pain. She had similar LUQ pain a month ago and came to [facility] ED, but it was less severe and lasted a few hours, lipase 21. Pt had biliary colic of epigastric pain that radiated to the back and had her gallbladder removed last year. Denies etoh, previous hx pancreatitis. Endorses itching. Denies sweats, hematemesis, wheezing, yellowing of skin/eyes, hematochezia, melena.    ED vitals afebrile, BP low 98/42 --> 122/73. Received pepcid, zofran, KCl 40mEq po, morphine 4mg IV x 2, dilaudid 1mg IV, 4L NS. Found to have gallstone pancreatitis on CTA C/A/P, plan for ERCP this morning.      HOSPITAL MEDICATIONS:  ALBUTerol    90 MICROgram(s) HFA Inhaler 2 Puff(s) Inhalation every 6 hours PRN Shortness of Breath and/or Wheezing  enoxaparin Injectable 40 milliGRAM(s) SubCutaneous every 24 hours  HYDROmorphone  Injectable 1 milliGRAM(s) IV Push every 4 hours PRN moderate - severe pain  lactated ringers. 1000 milliLiter(s) IV Continuous <Continuous>  ondansetron Injectable 4 milliGRAM(s) IV Push every 6 hours PRN Nausea and/or Vomiting      OBJECTIVE:  Vital Signs Last 24 Hrs  T(C): 37 (2019 12:14), Max: 37.6 (2019 05:24)  T(F): 98.6 (2019 12:14), Max: 99.6 (2019 05:24)  HR: 82 (2019 12:14) (74 - 91)  BP: 121/78 (2019 12:14) (112/67 - 130/87)  BP(mean): --  RR: 16 (2019 12:14) (16 - 20)  SpO2: 97% (2019 12:14) (95% - 100%)     @ 07:01  -   @ 07:00  --------------------------------------------------------  IN: 551 mL / OUT: 0 mL / NET: 551 mL      CAPILLARY BLOOD GLUCOSE    POCT Blood Glucose.: 156 mg/dL (2019 09:12)        PHYSICAL EXAM:  	GENERAL: well-developed, +distress due to abd pain  	HEAD:  Atraumatic, Normocephalic  	ENT: PERRL, conjunctiva and sclera clear, neck supple, no JVD, moist mucosa, posterior oropharynx clear, +sublingual jaundice  	CHEST/LUNG: Clear to auscultation bilaterally but poor inspiratory effort 2/2 pain; No wheeze, equal breath sounds bilaterally, respirations nonlabored, on RA  	HEART: Regular rate and rhythm; No murmurs, rubs, or gallops  	ABDOMEN: Soft, nondistended, +L sided tenderness greatest in LUQ, no guarding or rebound tenderness; Bowel sounds present, no organomegaly  	BACK: no spinal tenderness, no CVA tenderness  	EXTREMITIES:  No clubbing, cyanosis, or edema  	PSYCH: Nl behavior, nl affect  	NEUROLOGY: AAOx3, non-focal, moves all extremities spontaneously    SKIN: Normal color, No rashes or lesions      LABS:                        11.2   8.58  )-----------( 174      ( 2019 07:00 )             33.4     Hgb Trend: 11.2<--, 12.9<--      137  |  102  |  5<L>  ----------------------------<  144<H>  3.3<L>   |  22  |  0.53    Ca    8.1<L>      2019 07:00  Phos  2.7       Mg     1.7         TPro  6.1  /  Alb  3.3  /  TBili  2.7<H>  /  DBili  x   /  AST  70<H>  /  ALT  87<H>  /  AlkPhos  146<H>      Creatinine Trend: 0.53<--, 0.58<--, 0.71<--  PT/INR - ( 2019 07:00 )   PT: 13.0 SEC;   INR: 1.17       PTT - ( 2019 13:20 )  PTT:26.6 SEC  Urinalysis Basic - ( 2019 01:15 )    Color: DARK YELLOW / Appearance: Lt TURBID / S.028 / pH: 6.5  Gluc: 150 / Ketone: NEGATIVE  / Bili: SMALL / Urobili: NORMAL   Blood: TRACE / Protein: 10 / Nitrite: NEGATIVE   Leuk Esterase: NEGATIVE / RBC: 3-5 / WBC 0-2   Sq Epi: OCC / Non Sq Epi: x / Bacteria: FEW    Venous Blood Gas:   @ 15:30  7.32/50/25/22/36.0  VBG Lactate: 1.7      RADIOLOGY:     CT Angio C/A/P:    No intramural hematoma or dissection    Acute pancreatitis secondary to 4 mm CBD stone. No abscess or pseudocyst

## 2019-04-26 NOTE — CONSULT NOTE ADULT - ASSESSMENT
Impression:  1) Abdominal pain, nausea/vomiting with CT showing gallstone pancreatitis and choledocholithiasis   2) History of cholecystectomy (2018)    Recommendations:  - NPO  - aggressive IVF  - ERCP likely today  - pain control as needed  - daily CMP    [place], [id number]

## 2019-04-26 NOTE — CONSULT NOTE ADULT - SUBJECTIVE AND OBJECTIVE BOX
GASTROENTEROLOGY INITIAL CONSULT NOTE    Chief Complaint:  Patient is a 34y old  Female who presents with a chief complaint of Gallstone pancreatitis (2019 19:09)      HPI: 34y Female with past medical history obesity, cholecystectomy (2018), asthma who presented to ED with abdominal pain. Patient reported one day of severe, sharp, constant epigastric and LUQ abdominal pain radiating to the back and neck. This has been associated with nausea and multiple episodes of vomiting. She denies fevers, chills, melena, hematochezia.     Allergies:  No Known Allergies      Hospital Medications:  ALBUTerol    90 MICROgram(s) HFA Inhaler 2 Puff(s) Inhalation every 6 hours PRN  enoxaparin Injectable 40 milliGRAM(s) SubCutaneous every 24 hours  HYDROmorphone  Injectable 1 milliGRAM(s) IV Push every 4 hours PRN  lactated ringers. 1000 milliLiter(s) IV Continuous <Continuous>  ondansetron Injectable 4 milliGRAM(s) IV Push every 6 hours PRN      PMHX/PSHX:  Gastritis  Obesity  Fatty liver  Calculus of gallbladder with chronic cholecystitis without obstruction  Asthma  No pertinent past medical history  S/P cholecystectomy  No significant past surgical history      Family history:  No pertinent family history in first degree relatives      Social History:     ROS:     General:  No wt loss, fevers, chills, night sweats, fatigue,   Eyes:  Good vision, no reported pain  ENT:  No sore throat, pain, runny nose, dysphagia  CV:  No pain, palpitations, hypo/hypertension  Resp:  No dyspnea, cough, tachypnea, wheezing  GI:  see HPI  :  No pain, bleeding, incontinence, nocturia  Muscle:  No pain, weakness  Neuro:  No weakness, tingling, memory problems  Psych:  No fatigue, insomnia, mood problems, depression  Endocrine:  No polyuria, polydipsia, cold/heat intolerance  Heme:  No petechiae, ecchymosis, easy bruisability  Skin:  No rash, tattoos, scars, edema      PHYSICAL EXAM:   Vital Signs:  Vital Signs Last 24 Hrs  T(C): 37.6 (2019 05:24), Max: 37.6 (2019 05:24)  T(F): 99.6 (2019 05:24), Max: 99.6 (2019 05:24)  HR: 88 (2019 05:24) (74 - 91)  BP: 115/69 (2019 05:24) (98/42 - 130/87)  BP(mean): --  RR: 16 (2019 05:24) (16 - 20)  SpO2: 98% (2019 05:24) (95% - 100%)  Daily Height in cm: 162.56 (2019 19:04)    Daily Weight in k.4 (2019 05:24)    GENERAL:  no acute distress  HEENT:  -icterus   CHEST:  clear bilaterally, no wheezes or rales  HEART:  RRR, S1S2  ABDOMEN:  soft, tender in epigastrum, non-distended, obese  EXTEREMITIES:  no  edema  SKIN:  warm/dry  NEURO:  alert, oriented, conversant     LABS:                        12.9   9.18  )-----------( 219      ( 2019 13:20 )             38.9     25    137  |  99  |  6<L>  ----------------------------<  315<H>  3.4<L>   |  22  |  0.58    Ca    9.1      2019 13:20  Phos  2.2       Mg     2.1         TPro  7.1  /  Alb  3.9  /  TBili  4.6<H>  /  DBili  x   /  AST  109<H>  /  ALT  121<H>  /  AlkPhos  174<H>  25    LIVER FUNCTIONS - ( 2019 13:20 )  Alb: 3.9 g/dL / Pro: 7.1 g/dL / ALK PHOS: 174 u/L / ALT: 121 u/L / AST: 109 u/L / GGT: x           PT/INR - ( 2019 13:20 )   PT: 11.4 SEC;   INR: 1.03          PTT - ( 2019 13:20 )  PTT:26.6 SEC  Amylase Serum--      Lipase serum> 600       Ammonia--      Imaging:  < from: CT Angio Abdomen and Pelvis w/ IV Cont (19 @ 16:03) >  CHEST:     LUNGS AND LARGE AIRWAYS: Minimal dependent atelectasis. Right lower lobe   0.6 cm calcified granuloma.    PLEURA: No pleural effusion.    VESSELS: Aorta is normal in caliber. No intramural thrombus, aortic   dissection or aneurysm. Pulmonary artery caliber is enlarged measuring   0.3 cm, which may be seen in the setting of pulmonary hypertension. No   pulmonary embolism in the main, right, left, or lobar pulmonary arteries.    HEART: Heart size is enlarged. No pericardial effusion.    MEDIASTINUM AND FAITH: No lymphadenopathy.    CHEST WALL AND LOWER NECK: Within normal limits.    ABDOMEN AND PELVIS:    LIVER: Within normal limits.    BILE DUCTS: The common bile duct is dilated, measuring approximately 2 cm   in maximal dimension. There is evidence of choledocholithiasis (3:125.)   Associated intrahepatic biliary dilation is identified.    PANCREAS: There is hypoattenuation of the pancreatic head, which may   reflect focus of inflammation. Marked mesenteric stranding change and   fluid surrounds the pancreas. No discrete fluid collection.    GALLBLADDER: Cholecystectomy.    SPLEEN: Within normal limits.    ADRENALS: Within normal limits.    KIDNEYS/URETERS: Within normal limits.    BLADDER: Within normal limits.    REPRODUCTIVE ORGANS: Uterus within normal limits.    BOWEL: Wall thickening of the second portion of duodenum, likely   secondary to adjacent pancreatitis. No bowel obstruction.    PERITONEUM: Trace ascites. Linear radiopacities in the right lower   abdomen, correlate with surgical history.    VESSELS:  No aortic dissection or aneurysm. The vessels are within normal   limits.    RETROPERITONEUM: No lymphadenopathy.      ABDOMINAL WALL: Ventral postoperative fibrosis and fat-containing hernia.    BONES: Within normal limits.    IMPRESSION:     No intramural hematoma or dissection    Acute pancreatitis secondary to 4 mm CBD stone. No abscess or pseudocyst                MARY VASQUEZ M.D., RADIOLOGY RESIDENT  This document has been electronically signed.  PAYTON ROTHMAN M.D., ATTENDING RADIOLOGIST  This document has been electronically signed. 2019  4:39PM    < end of copied text >

## 2019-04-26 NOTE — PROGRESS NOTE ADULT - PROBLEM SELECTOR PLAN 1
Acute pancreatitis with CBD dilation to 2cm and intrahepatic ductal dilation with 4mm CBD stone on imaging and obstructive pattern LFT abnormalities with TB 4.6. Serum lipase >600. Pt denies etoh, hx pancreatitis. Calcium wnl. No fluid collections on imaging, afebrile, no leukoctysosis; no signs of infection currently. Pt is hemodynamically stable  -Pain control: dilaudid 1mg IV q4 mod-severe pain  -no sign pancreatic necrosis, monitor off abx  -s/p 4L NS, start LR @150cc/hr x 18hr  -strict I/Os, monitor LFTs  -Zofran prn, monitor QTc  -GI consult requested  -FLD, NPO at [facility] for ERCP  -f/u lipid profile Acute pancreatitis with CBD dilation to 2cm and intrahepatic ductal dilation with 4mm CBD stone on imaging and obstructive pattern LFT abnormalities with TB 4.6. Serum lipase >600. Pt denies etoh, hx pancreatitis. Calcium wnl. No fluid collections on imaging, afebrile, no leukoctysosis; no signs of infection currently. Pt is hemodynamically stable  -NPO for ERCP this morning  -Pain control: dilaudid 1mg IV q4 mod-severe pain  -no sign pancreatic necrosis, monitor off abx  -s/p 4L NS, c/w LR @150cc/hr  -strict I/Os, monitor LFTs & lipase  -Zofran prn, monitor QTc

## 2019-04-26 NOTE — PROGRESS NOTE ADULT - ASSESSMENT
35yo Divehi-speaking F with hx gallstones s/p cholecystectomy (7/2018), gastritis, fatty liver, asthma presents with acute gallstone pancreatitis.

## 2019-04-26 NOTE — CONSULT NOTE ADULT - ATTENDING COMMENTS
34 year old female S/P choly in 2018. Presents with epigastric LUQ pain, TB= 4.6, lipase > 600, imaging shows stones in the CBD.    Plan:  ERCP

## 2019-04-26 NOTE — PROGRESS NOTE ADULT - ATTENDING COMMENTS
34 y.o. Female w/ Hx cholecystectomy in 2016 p/w LUQ pain, N/V x 1 day found to have an elevated lipase of 600. CT A/P confirmed acute pancreatitis with CBD stone. Patient a/w gallstone pancreatitis now s/p ERCP w/ sphincterotomy and stone extraction. CT chest showed enlarged pulmonary artery so checking TTE. advance diet as tolerated. F/U GI recs. 34 y.o. Female w/ Hx asthma, gallstones s/p cholecystectomy in 2018 p/w LUQ pain, N/V x 1 day found to have an elevated lipase of 600. CT A/P confirmed acute pancreatitis with CBD stone. Patient a/w gallstone pancreatitis now s/p ERCP w/ sphincterotomy and stone extraction. CT chest showed enlarged pulmonary artery so checking TTE. advance diet as tolerated. F/U GI recs.

## 2019-04-26 NOTE — CHART NOTE - NSCHARTNOTEFT_GEN_A_CORE
GI CHART NOTE    Patient s/p ERCP with sphincterotomy, balloon sweep with stone/sludge extraction.    Recommendations:  - NPO today; advance to clear liquid diet tomorrow  - aggressive IVF  - pain control as needed  - daily CMP

## 2019-04-26 NOTE — PROGRESS NOTE ADULT - PROBLEM SELECTOR PLAN 3
Low suspicion ACS for L chest pain in patient with no risk factors. trop neg, EKG wnl. CTA chest neg for PE, dissection, or other pulmonary process Low suspicion ACS for L chest pain in patient with no risk factors. trop neg, EKG wnl. CTA chest neg for PE, dissection, or other pulmonary process but did show cardiomegaly and enlarged pulmonary artery. No prior TTE in EMR.  - TTE

## 2019-04-27 ENCOUNTER — TRANSCRIPTION ENCOUNTER (OUTPATIENT)
Age: 34
End: 2019-04-27

## 2019-04-27 LAB
ALBUMIN SERPL ELPH-MCNC: 3.5 G/DL — SIGNIFICANT CHANGE UP (ref 3.3–5)
ALP SERPL-CCNC: 139 U/L — HIGH (ref 40–120)
ALT FLD-CCNC: 69 U/L — HIGH (ref 4–33)
ANION GAP SERPL CALC-SCNC: 14 MMO/L — SIGNIFICANT CHANGE UP (ref 7–14)
AST SERPL-CCNC: 44 U/L — HIGH (ref 4–32)
BILIRUB SERPL-MCNC: 1.7 MG/DL — HIGH (ref 0.2–1.2)
BUN SERPL-MCNC: 8 MG/DL — SIGNIFICANT CHANGE UP (ref 7–23)
CALCIUM SERPL-MCNC: 9.5 MG/DL — SIGNIFICANT CHANGE UP (ref 8.4–10.5)
CHLORIDE SERPL-SCNC: 103 MMOL/L — SIGNIFICANT CHANGE UP (ref 98–107)
CO2 SERPL-SCNC: 20 MMOL/L — LOW (ref 22–31)
CREAT SERPL-MCNC: 0.56 MG/DL — SIGNIFICANT CHANGE UP (ref 0.5–1.3)
GLUCOSE SERPL-MCNC: 148 MG/DL — HIGH (ref 70–99)
HCT VFR BLD CALC: 34.2 % — LOW (ref 34.5–45)
HGB BLD-MCNC: 11.1 G/DL — LOW (ref 11.5–15.5)
LIDOCAIN IGE QN: 233.3 U/L — HIGH (ref 7–60)
MAGNESIUM SERPL-MCNC: 2 MG/DL — SIGNIFICANT CHANGE UP (ref 1.6–2.6)
MCHC RBC-ENTMCNC: 31.7 PG — SIGNIFICANT CHANGE UP (ref 27–34)
MCHC RBC-ENTMCNC: 32.5 % — SIGNIFICANT CHANGE UP (ref 32–36)
MCV RBC AUTO: 97.7 FL — SIGNIFICANT CHANGE UP (ref 80–100)
NRBC # FLD: 0 K/UL — SIGNIFICANT CHANGE UP (ref 0–0)
PHOSPHATE SERPL-MCNC: 2.6 MG/DL — SIGNIFICANT CHANGE UP (ref 2.5–4.5)
PLATELET # BLD AUTO: 192 K/UL — SIGNIFICANT CHANGE UP (ref 150–400)
PMV BLD: 11.3 FL — SIGNIFICANT CHANGE UP (ref 7–13)
POTASSIUM SERPL-MCNC: 3.4 MMOL/L — LOW (ref 3.5–5.3)
POTASSIUM SERPL-SCNC: 3.4 MMOL/L — LOW (ref 3.5–5.3)
PROT SERPL-MCNC: 6.8 G/DL — SIGNIFICANT CHANGE UP (ref 6–8.3)
RBC # BLD: 3.5 M/UL — LOW (ref 3.8–5.2)
RBC # FLD: 14.3 % — SIGNIFICANT CHANGE UP (ref 10.3–14.5)
SODIUM SERPL-SCNC: 137 MMOL/L — SIGNIFICANT CHANGE UP (ref 135–145)
SPECIMEN SOURCE: SIGNIFICANT CHANGE UP
WBC # BLD: 7.7 K/UL — SIGNIFICANT CHANGE UP (ref 3.8–10.5)
WBC # FLD AUTO: 7.7 K/UL — SIGNIFICANT CHANGE UP (ref 3.8–10.5)

## 2019-04-27 PROCEDURE — 99233 SBSQ HOSP IP/OBS HIGH 50: CPT | Mod: GC

## 2019-04-27 PROCEDURE — 99232 SBSQ HOSP IP/OBS MODERATE 35: CPT | Mod: GC

## 2019-04-27 RX ORDER — POTASSIUM CHLORIDE 20 MEQ
10 PACKET (EA) ORAL
Qty: 0 | Refills: 0 | Status: COMPLETED | OUTPATIENT
Start: 2019-04-27 | End: 2019-04-27

## 2019-04-27 RX ORDER — CALAMINE AND ZINC OXIDE AND PHENOL 160; 10 MG/ML; MG/ML
1 LOTION TOPICAL DAILY
Qty: 0 | Refills: 0 | Status: DISCONTINUED | OUTPATIENT
Start: 2019-04-27 | End: 2019-04-30

## 2019-04-27 RX ADMIN — Medication 100 MILLIEQUIVALENT(S): at 14:38

## 2019-04-27 RX ADMIN — HYDROMORPHONE HYDROCHLORIDE 1 MILLIGRAM(S): 2 INJECTION INTRAMUSCULAR; INTRAVENOUS; SUBCUTANEOUS at 16:49

## 2019-04-27 RX ADMIN — HYDROMORPHONE HYDROCHLORIDE 1 MILLIGRAM(S): 2 INJECTION INTRAMUSCULAR; INTRAVENOUS; SUBCUTANEOUS at 11:43

## 2019-04-27 RX ADMIN — CALAMINE AND ZINC OXIDE AND PHENOL 1 APPLICATION(S): 160; 10 LOTION TOPICAL at 18:42

## 2019-04-27 RX ADMIN — ENOXAPARIN SODIUM 40 MILLIGRAM(S): 100 INJECTION SUBCUTANEOUS at 05:47

## 2019-04-27 RX ADMIN — Medication 100 MILLIEQUIVALENT(S): at 14:41

## 2019-04-27 RX ADMIN — Medication 25 MILLIGRAM(S): at 14:38

## 2019-04-27 RX ADMIN — HYDROMORPHONE HYDROCHLORIDE 1 MILLIGRAM(S): 2 INJECTION INTRAMUSCULAR; INTRAVENOUS; SUBCUTANEOUS at 11:28

## 2019-04-27 RX ADMIN — HYDROMORPHONE HYDROCHLORIDE 1 MILLIGRAM(S): 2 INJECTION INTRAMUSCULAR; INTRAVENOUS; SUBCUTANEOUS at 17:04

## 2019-04-27 NOTE — PROGRESS NOTE ADULT - ASSESSMENT
35yo Frisian-speaking F with hx gallstones s/p cholecystectomy (7/2018), gastritis, fatty liver, asthma presents with acute gallstone pancreatitis. 33yo German-speaking woman with hx gallstones s/p cholecystectomy (7/2018), gastritis, fatty liver, asthma presents with acute gallstone pancreatitis s/p ERCP.

## 2019-04-27 NOTE — PROGRESS NOTE ADULT - PROBLEM SELECTOR PLAN 1
Acute pancreatitis with CBD dilation to 2cm and intrahepatic ductal dilation with 4mm CBD stone on imaging and obstructive pattern LFT abnormalities with TB 4.6. Serum lipase >600. Pt denies etoh, hx pancreatitis. Calcium wnl. No fluid collections on imaging, afebrile, no leukoctysosis; no signs of infection currently. Pt is hemodynamically stable  -NPO for ERCP this morning  -Pain control: dilaudid 1mg IV q4 mod-severe pain  -no sign pancreatic necrosis, monitor off abx  -s/p 4L NS, c/w LR @150cc/hr  -strict I/Os, monitor LFTs & lipase  -Zofran prn, monitor QTc Acute pancreatitis with CBD dilation to 2cm and intrahepatic ductal dilation with 4mm CBD stone on imaging and obstructive pattern LFT abnormalities with TB 4.6. Serum lipase >600. Pt denies etoh, hx pancreatitis. Calcium wnl. No fluid collections on imaging, afebrile, no leukoctysosis; no signs of infection currently. Pt is hemodynamically stable. Underwent ERCP on 4/26 showing choledocholithiasis w filling defect 2/2 stone, underwent sphincterotomy w balloon sweep and stone/sludge extraction.   -advance to CLD per GI recs  -Pain control: dilaudid 1mg IV q4 mod-severe pain  -no sign pancreatic necrosis, monitor off abx  -s/p 4L NS, c/w LR @150cc/hr  -strict I/Os, monitor LFTs & lipase  -Zofran prn, monitor QTc

## 2019-04-27 NOTE — PROGRESS NOTE ADULT - SUBJECTIVE AND OBJECTIVE BOX
INTERVAL EVENTS / SUBJECTIVE:    ***      HOSPITAL MEDICATIONS:  ALBUTerol    90 MICROgram(s) HFA Inhaler 2 Puff(s) Inhalation every 6 hours PRN Shortness of Breath and/or Wheezing  diphenhydrAMINE 25 milliGRAM(s) Oral every 6 hours PRN Rash and/or Itching  enoxaparin Injectable 40 milliGRAM(s) SubCutaneous every 24 hours  HYDROmorphone  Injectable 1 milliGRAM(s) IV Push every 4 hours PRN moderate - severe pain  lactated ringers. 1000 milliLiter(s) IV Continuous <Continuous>  ondansetron Injectable 4 milliGRAM(s) IV Push every 6 hours PRN Nausea and/or Vomiting      OBJECTIVE:  Vital Signs Last 24 Hrs  T(C): 36.8 (2019 05:53), Max: 37.2 (2019 20:11)  T(F): 98.3 (2019 05:53), Max: 98.9 (2019 20:11)  HR: 64 (2019 05:53) (64 - 83)  BP: 114/52 (2019 05:53) (114/52 - 125/65)  BP(mean): --  RR: 18 (2019 05:53) (16 - 18)  SpO2: 98% (2019 05:53) (97% - 98%)     @ 07:01  -   @ 07:00  --------------------------------------------------------  IN: 1910 mL / OUT: 1050 mL / NET: 860 mL      PHYSICAL EXAM:  	GENERAL: well-developed, +distress due to abd pain  	HEAD:  Atraumatic, Normocephalic  	ENT: PERRL, conjunctiva and sclera clear, neck supple, no JVD, moist mucosa, posterior oropharynx clear, +sublingual jaundice  	CHEST/LUNG: Clear to auscultation bilaterally but poor inspiratory effort 2/2 pain; No wheeze, equal breath sounds bilaterally, respirations nonlabored, on RA  	HEART: Regular rate and rhythm; No murmurs, rubs, or gallops  	ABDOMEN: Soft, nondistended, +L sided tenderness greatest in LUQ, no guarding or rebound tenderness; Bowel sounds present, no organomegaly  	BACK: no spinal tenderness, no CVA tenderness  	EXTREMITIES:  No clubbing, cyanosis, or edema  	PSYCH: Nl behavior, nl affect  	NEUROLOGY: AAOx3, non-focal, moves all extremities spontaneously    SKIN: Normal color, No rashes or lesions      LABS:                        11.1   7.70  )-----------( 192      ( 2019 06:30 )             34.2     Hgb Trend: 11.1<--, 11.2<--, 12.9<--      137  |  103  |  8   ----------------------------<  148<H>  3.4<L>   |  20<L>  |  0.56    Ca    9.5      2019 06:30  Phos  2.6       Mg     2.0         TPro  6.8  /  Alb  3.5  /  TBili  1.7<H>  /  DBili  x   /  AST  44<H>  /  ALT  69<H>  /  AlkPhos  139<H>      Creatinine Trend: 0.56<--, 0.53<--, 0.58<--, 0.71<--  PT/INR - ( 2019 07:00 )   PT: 13.0 SEC;   INR: 1.17       PTT - ( 2019 13:20 )  PTT:26.6 SEC  Urinalysis Basic - ( 2019 01:15 )    Color: DARK YELLOW / Appearance: Lt TURBID / S.028 / pH: 6.5  Gluc: 150 / Ketone: NEGATIVE  / Bili: SMALL / Urobili: NORMAL   Blood: TRACE / Protein: 10 / Nitrite: NEGATIVE   Leuk Esterase: NEGATIVE / RBC: 3-5 / WBC 0-2   Sq Epi: OCC / Non Sq Epi: x / Bacteria: FEW    Venous Blood Gas:   @ 15:30  7.32/50/25/22/36.0  VBG Lactate: 1.7      MICROBIOLOGY:    Culture - Urine (collected 2019 04:39)  Source: URINE MIDSTREAM  Preliminary Report (2019 09:03):    GNRID^Gram Neg Samuel To Be Identified    COLONY COUNT: > = 100,000 CFU/ML      RADIOLOGY:     CT Angio C/A/P:    No intramural hematoma or dissection    Acute pancreatitis secondary to 4 mm CBD stone. No abscess or pseudocyst INTERVAL EVENTS / SUBJECTIVE:    Underwent ERCP yesterday showing choledocholithiasis w filling defect 2/2 stone, underwent sphincterotomy w balloon sweep and stone/sludge extraction. No acute events overnight. Pt seen and examined at bedside this am. Pt reports abd pain improved from 10/10 to 4/10 in severity. Reports pruritis. Reports loose BMs x2. Denies dysurea or polyurea, but does endorse having had dysurea 1 week ago. Advanced to CLD per GI recs.      HOSPITAL MEDICATIONS:  ALBUTerol    90 MICROgram(s) HFA Inhaler 2 Puff(s) Inhalation every 6 hours PRN Shortness of Breath and/or Wheezing  diphenhydrAMINE 25 milliGRAM(s) Oral every 6 hours PRN Rash and/or Itching  enoxaparin Injectable 40 milliGRAM(s) SubCutaneous every 24 hours  HYDROmorphone  Injectable 1 milliGRAM(s) IV Push every 4 hours PRN moderate - severe pain  lactated ringers. 1000 milliLiter(s) IV Continuous <Continuous>  ondansetron Injectable 4 milliGRAM(s) IV Push every 6 hours PRN Nausea and/or Vomiting      OBJECTIVE:  Vital Signs Last 24 Hrs  T(C): 36.8 (2019 05:53), Max: 37.2 (2019 20:11)  T(F): 98.3 (2019 05:53), Max: 98.9 (2019 20:11)  HR: 64 (2019 05:53) (64 - 83)  BP: 114/52 (2019 05:53) (114/52 - 125/65)  BP(mean): --  RR: 18 (2019 05:53) (16 - 18)  SpO2: 98% (2019 05:53) (97% - 98%)     @ 07:01  -   @ 07:00  --------------------------------------------------------  IN: 1910 mL / OUT: 1050 mL / NET: 860 mL      PHYSICAL EXAM:  	GENERAL: well-developed, NAD  	HEAD:  Atraumatic, Normocephalic  	ENT: PERRL, conjunctiva and sclera clear, neck supple, no JVD, moist mucosa, posterior oropharynx clear, +sublingual jaundice  	CHEST/LUNG: Clear to auscultation bilaterally but poor inspiratory effort 2/2 pain; No wheeze, equal breath sounds bilaterally, respirations nonlabored, on RA  	HEART: Regular rate and rhythm; No murmurs, rubs, or gallops  	ABDOMEN: Soft, nondistended, +L sided tenderness greatest in LUQ improved, no guarding or rebound tenderness; Bowel sounds present, no organomegaly  	BACK: no spinal tenderness, no CVA tenderness  	EXTREMITIES:  No clubbing, cyanosis, or edema  	PSYCH: Nl behavior, nl affect  	NEUROLOGY: AAOx3, non-focal, moves all extremities spontaneously    SKIN: Normal color, No rashes or lesions      LABS:                        11.1   7.70  )-----------( 192      ( 2019 06:30 )             34.2     Hgb Trend: 11.1<--, 11.2<--, 12.9<--      137  |  103  |  8   ----------------------------<  148<H>  3.4<L>   |  20<L>  |  0.56    Ca    9.5      2019 06:30  Phos  2.6       Mg     2.0         TPro  6.8  /  Alb  3.5  /  TBili  1.7<H>  /  DBili  x   /  AST  44<H>  /  ALT  69<H>  /  AlkPhos  139<H>      Creatinine Trend: 0.56<--, 0.53<--, 0.58<--, 0.71<--  PT/INR - ( 2019 07:00 )   PT: 13.0 SEC;   INR: 1.17       PTT - ( 2019 13:20 )  PTT:26.6 SEC  Urinalysis Basic - ( 2019 01:15 )    Color: DARK YELLOW / Appearance: Lt TURBID / S.028 / pH: 6.5  Gluc: 150 / Ketone: NEGATIVE  / Bili: SMALL / Urobili: NORMAL   Blood: TRACE / Protein: 10 / Nitrite: NEGATIVE   Leuk Esterase: NEGATIVE / RBC: 3-5 / WBC 0-2   Sq Epi: OCC / Non Sq Epi: x / Bacteria: FEW    Venous Blood Gas:   @ 15:30  7.32/50/25/22/36.0  VBG Lactate: 1.7      MICROBIOLOGY:    Culture - Urine (collected 2019 04:39)  Source: URINE MIDSTREAM  Preliminary Report (2019 09:03):    GNRID^Gram Neg Samuel To Be Identified    COLONY COUNT: > = 100,000 CFU/ML      RADIOLOGY:     CT Angio C/A/P:    No intramural hematoma or dissection    Acute pancreatitis secondary to 4 mm CBD stone. No abscess or pseudocyst

## 2019-04-27 NOTE — DISCHARGE NOTE PROVIDER - INSTRUCTIONS
Please eat a healthy diet with plenty of fruits, vegetables, and lean protein such as low-fat dairy products, fish, and poultry.

## 2019-04-27 NOTE — DISCHARGE NOTE PROVIDER - NSDCCPCAREPLAN_GEN_ALL_CORE_FT
PRINCIPAL DISCHARGE DIAGNOSIS  Diagnosis: Acute pancreatitis  Assessment and Plan of Treatment: You were found to have pancreatitis, meaning inflammation of your pancreas. This was likely cause by gallstones causing obstruction of your duct. You were treated with IV fluids and underwent ERCP with removal of the obstructing stone and sludge, and your pancreatitis improved. Please follow up with gastroenterology within 1-2 weeks for further management. PRINCIPAL DISCHARGE DIAGNOSIS  Diagnosis: Acute pancreatitis  Assessment and Plan of Treatment: You were found to have pancreatitis, meaning inflammation of your pancreas. This was likely cause by gallstones causing obstruction of your duct. You were treated with IV fluids and underwent ERCP with removal of the obstructing stone and sludge, and your pancreatitis improved. Please call [phone] in order to schedule an appointment with Gastroenterology within 1-2 weeks after discharge for follow-up. Also, please call [phone] in order to schedule an appointment to establish care with a PMD (Address: [address] [place] [address], [address] [facility], [place], [address]).

## 2019-04-27 NOTE — DISCHARGE NOTE PROVIDER - CARE PROVIDER_API CALL
Reynold Adkins  [address] [place] [address]  Phone: [phone]  Fax: (   )    -  Follow Up Time:     Tiffanie Alfonso)  Internal Medicine  [address] [place] [id number]  Phone: [phone]  Fax: [phone]  Follow Up Time:

## 2019-04-27 NOTE — DISCHARGE NOTE PROVIDER - HOSPITAL COURSE
History of Present Illness:        35yo Persian-speaking Female with hx gallstones s/p cholecystectomy (7/2018), gastritis, fatty liver, asthma presents with abdominal pain and vomiting. Pt developed severe LUQ abd pain that radiates to her left chest and left neck. It is worse with twisting motion and taking a deep breath. She ate some potatoes, but vomited it up. Emesis looked like water and the rim was the color of coffee, no blood or bile. She has vomited 9 times and was able to keep down juice once she got nausea medication. Starting this morning she had 2 liquid stools. Pt endorses chills at home, difficulty breathing due to pain, generalized weakness, and abdominal distension. Has been taking Motrin for the pain. She had similar LUQ pain a month ago and came to [facility] ED, but it was less severe and lasted a few hours, lipase 21. Pt had biliary colic of epigastric pain that radiated to the back and had her gallbladder removed last year. Denies etoh, previous hx pancreatitis. Endorses itching. Denies sweats, hematemesis, wheezing, yellowing of skin/eyes, hematochezia, melena.        ED vitals afebrile, BP low 98/42 --> 122/73. Received pepcid, zofran, KCl 40mEq po, morphine 4mg IV x 2, dilaudid 1mg IV, 4L NS.            Hospital Course:     4/26: Admitted for gallstone pancreatitis w 4mm CBD stone on CT A/P and lipase > 600. ERCP showed filling defect 2/2 stone & choledocholithiasis, underwent sphincterotomy. C/w NPO & IVF. CTA C/A/P w cardiomegaly & enlarged pulm a, TTE wnl w EF 69%.     4/27: pain improved from 10/10 to 4/10. lipase downtrended to 233. advanced to CLD. UCx w GNRs > 100K but no dysuria since 1w ago, no need to treat History of Present Illness:        33yo English-speaking Female with hx gallstones s/p cholecystectomy (7/2018), gastritis, fatty liver, asthma presents with abdominal pain and vomiting. Pt developed severe LUQ abd pain that radiates to her left chest and left neck. It is worse with twisting motion and taking a deep breath. She ate some potatoes, but vomited it up. Emesis looked like water and the rim was the color of coffee, no blood or bile. She has vomited 9 times and was able to keep down juice once she got nausea medication. Starting this morning she had 2 liquid stools. Pt endorses chills at home, difficulty breathing due to pain, generalized weakness, and abdominal distension. Has been taking Motrin for the pain. She had similar LUQ pain a month ago and came to [facility] ED, but it was less severe and lasted a few hours, lipase 21. Pt had biliary colic of epigastric pain that radiated to the back and had her gallbladder removed last year. Denies etoh, previous hx pancreatitis. Endorses itching. Denies sweats, hematemesis, wheezing, yellowing of skin/eyes, hematochezia, melena.        ED vitals afebrile, BP low 98/42 --> 122/73. Received pepcid, zofran, KCl 40mEq po, morphine 4mg IV x 2, dilaudid 1mg IV, 4L NS.            Hospital Course:     33yo English-speaking F with hx gallstones s/p cholecystectomy (7/2018), gastritis, fatty liver, asthma presents with abdominal pain, found to have acute gallstone pancreatitis. CTA C/A/P showed acute pancreatitis with CBD dilation to 2cm and intrahepatic ductal dilation with 4mm CBD stone and labs showed obstructive pattern LFT abnormalities with TB 4.6. Serum lipase >600. Pt denied etoh, hx pancreatitis. Calcium wnl. No fluid collections on imaging, afebrile, no leukocytosis, no signs of infection. Pt was made NPO and treated with IVF. She underwent ERCP on 4/26 which showed choledocholithiasis w filling defect 2/2 stone, underwent sphincterotomy w balloon sweep and stone/sludge extraction. After the ERCP, pt’s abd pain improved and her lipase downtrended. Pt also underwent TTE due to cardiomegaly and enlarged pulmonary arteries noted on CT angio, however TTE was wnl with EF 69%. Pt was slightly hyperglycemic likely i/s/o pancreatitis, HgA1C 6.3% was consistent w pre-diabetes and her glucose remained stable. She was found to have bacteriuria with > 100K E Coli, however denied symptoms therefore did not treat given asymptomatic bacteriuria. ***ADD REMAINDER OF HOSPITAL COURSE*** History of Present Illness:        33yo Yakut-speaking Female with hx gallstones s/p cholecystectomy (7/2018), gastritis, fatty liver, asthma presents with abdominal pain and vomiting. Pt developed severe LUQ abd pain that radiates to her left chest and left neck. It is worse with twisting motion and taking a deep breath. She ate some potatoes, but vomited it up. Emesis looked like water and the rim was the color of coffee, no blood or bile. She has vomited 9 times and was able to keep down juice once she got nausea medication. Starting this morning she had 2 liquid stools. Pt endorses chills at home, difficulty breathing due to pain, generalized weakness, and abdominal distension. Has been taking Motrin for the pain. She had similar LUQ pain a month ago and came to [facility] ED, but it was less severe and lasted a few hours, lipase 21. Pt had biliary colic of epigastric pain that radiated to the back and had her gallbladder removed last year. Denies etoh, previous hx pancreatitis. Endorses itching. Denies sweats, hematemesis, wheezing, yellowing of skin/eyes, hematochezia, melena.        ED vitals afebrile, BP low 98/42 --> 122/73. Received pepcid, zofran, KCl 40mEq po, morphine 4mg IV x 2, dilaudid 1mg IV, 4L NS.            Hospital Course:     33yo Yakut-speaking F with hx gallstones s/p cholecystectomy (7/2018), gastritis, fatty liver, asthma presents with abdominal pain, found to have acute gallstone pancreatitis. CTA C/A/P showed acute pancreatitis with CBD dilation to 2cm and intrahepatic ductal dilation with 4mm CBD stone and labs showed obstructive pattern LFT abnormalities with TB 4.6. Serum lipase >600. Pt denied etoh, hx pancreatitis. Calcium wnl. No fluid collections on imaging, afebrile, no leukocytosis, no signs of infection. Pt was made NPO and treated with IVF. She underwent ERCP on 4/26 which showed choledocholithiasis w filling defect 2/2 stone, underwent sphincterotomy w balloon sweep and stone/sludge extraction. After the ERCP, pt’s abd pain improved and her lipase downtrended. Pt also underwent TTE due to cardiomegaly and enlarged pulmonary arteries noted on CT angio, however TTE was wnl with EF 69%. Pt was slightly hyperglycemic likely i/s/o pancreatitis, HgA1C 6.3% was consistent w pre-diabetes and her glucose remained stable. She was found to have bacteriuria with > 100K E Coli and was started on ceftriaxone. Her abdominal pain improved. On 4/30, The patient was seen and evaluated and deemed medically stable for discharge. History of Present Illness:        35yo Telugu-speaking Female with hx gallstones s/p cholecystectomy (7/2018), gastritis, fatty liver, asthma presents with abdominal pain and vomiting. Pt developed severe LUQ abd pain that radiates to her left chest and left neck. It is worse with twisting motion and taking a deep breath. She ate some potatoes, but vomited it up. Emesis looked like water and the rim was the color of coffee, no blood or bile. She has vomited 9 times and was able to keep down juice once she got nausea medication. Starting this morning she had 2 liquid stools. Pt endorses chills at home, difficulty breathing due to pain, generalized weakness, and abdominal distension. Has been taking Motrin for the pain. She had similar LUQ pain a month ago and came to [facility] ED, but it was less severe and lasted a few hours, lipase 21. Pt had biliary colic of epigastric pain that radiated to the back and had her gallbladder removed last year. Denies etoh, previous hx pancreatitis. Endorses itching. Denies sweats, hematemesis, wheezing, yellowing of skin/eyes, hematochezia, melena.        ED vitals afebrile, BP low 98/42 --> 122/73. Received pepcid, zofran, KCl 40mEq po, morphine 4mg IV x 2, dilaudid 1mg IV, 4L NS.            Hospital Course:     35yo Telugu-speaking F with hx gallstones s/p cholecystectomy (7/2018), gastritis, fatty liver, asthma presents with abdominal pain, found to have acute gallstone pancreatitis. CTA C/A/P showed acute pancreatitis with CBD dilation to 2cm and intrahepatic ductal dilation with 4mm CBD stone and labs showed obstructive pattern LFT abnormalities with TB 4.6. Serum lipase >600. Pt denied EtOH or history of pancreatitis. Calcium was WNL. No fluid collections on imaging, afebrile, no leukocytosis, no signs of infection. Pt was made NPO and treated with IVF. She underwent ERCP on 4/26 which showed choledocholithiasis with a filling defect 2/2 stone. She underwent a sphincterotomy with balloon sweep and stone/sludge extraction. After the ERCP, pt’s abd pain improved and her lipase downtrended. Pt also underwent TTE due to cardiomegaly and enlarged pulmonary arteries noted on CT angio, however TTE was WNL with EF 69%. Pt was slightly hyperglycemic likely i/s/o pancreatitis. Her HgA1C was 6.3% which was consistent with pre-diabetes and her glucose remained stable during her hospitalization. Patient was found to have bacteriuria with > 100K E Coli for which she completed a 3-day course of IV antibiotics. Her abdominal pain continued to improve during her hospitalization. On 4/30, the patient was seen and evaluated and deemed medically stable for discharge with plans to follow-up with GI and her PMD.

## 2019-04-27 NOTE — PROGRESS NOTE ADULT - PROBLEM SELECTOR PLAN 4
BMI 37. Lipid panel w cholesterol 219, triglycerides 162, HDL 40. UA w glucosuria & ketonuria. A1C 6.3% pre-diabetes. BMI 37. Lipid panel w cholesterol 219, triglycerides 162, HDL 40. UA w glucosuria & ketonuria. A1C 6.3% pre-diabetes.  - encouraged wt loss and exercise

## 2019-04-27 NOTE — PROGRESS NOTE ADULT - SUBJECTIVE AND OBJECTIVE BOX
Chief Complaint:  Patient is a 34y old  Female who presents with a chief complaint of Gallstone pancreatitis (2019 09:26)    Interval Events:   No acute overnight events. Abdominal Pain improving    Allergies:  No Known Allergies    Hospital Medications:  ALBUTerol    90 MICROgram(s) HFA Inhaler 2 Puff(s) Inhalation every 6 hours PRN  diphenhydrAMINE 25 milliGRAM(s) Oral every 6 hours PRN  enoxaparin Injectable 40 milliGRAM(s) SubCutaneous every 24 hours  HYDROmorphone  Injectable 1 milliGRAM(s) IV Push every 4 hours PRN  lactated ringers. 1000 milliLiter(s) IV Continuous <Continuous>  ondansetron Injectable 4 milliGRAM(s) IV Push every 6 hours PRN    PMHX/PSHX:  Gastritis  Obesity  Fatty liver  Calculus of gallbladder with chronic cholecystitis without obstruction  Asthma  No pertinent past medical history  S/P cholecystectomy  No significant past surgical history    ROS:   General:  No fevers, chills or night sweats  ENT:  No sore throat or dysphagia  CV:  No pain or palpitations  Resp:  No dyspnea, cough or  wheezing  GI:  + pain, No nausea, No vomiting, No rectal bleeding, No tarry stools,  Skin:  No rash or edema    PHYSICAL EXAM:   Vital Signs:  Vital Signs Last 24 Hrs  T(C): 36.8 (2019 10:09), Max: 37.2 (2019 20:11)  T(F): 98.3 (2019 10:09), Max: 98.9 (2019 20:11)  HR: 62 (2019 11:25) (62 - 83)  BP: 124/80 (2019 11:25) (114/52 - 125/65)  BP(mean): --  RR: 16 (2019 10:09) (16 - 18)  SpO2: 99% (2019 10:09) (97% - 99%)  Daily     Daily     GENERAL:  NAD, Appears stated age  HEENT:  NC/AT,  conjunctivae clear and pink, sclera -anicteric  CHEST:  Normal Effort, Breath sounds clear  HEART:  RRR, S1 + S2, no murmurs  ABDOMEN:  Soft, non-tender, non-distended, BS+  EXTEREMITIES:  no cyanosis or edema  SKIN:  Warm & Dry.  NEURO:  Alert, oriented    LABS:                        11.1   7.70  )-----------( 192      ( 2019 06:30 )             34.2     Mean Cell Volume: 97.7 fL (-19 @ 06:30)        137  |  103  |  8   ----------------------------<  148<H>  3.4<L>   |  20<L>  |  0.56    Ca    9.5      2019 06:30  Phos  2.6       Mg     2.0         TPro  6.8  /  Alb  3.5  /  TBili  1.7<H>  /  DBili  x   /  AST  44<H>  /  ALT  69<H>  /  AlkPhos  139<H>      LIVER FUNCTIONS - ( 2019 06:30 )  Alb: 3.5 g/dL / Pro: 6.8 g/dL / ALK PHOS: 139 u/L / ALT: 69 u/L / AST: 44 u/L / GGT: x           PT/INR - ( 2019 07:00 )   PT: 13.0 SEC;   INR: 1.17          PTT - ( 2019 13:20 )  PTT:26.6 SEC  Urinalysis Basic - ( 2019 01:15 )    Color: DARK YELLOW / Appearance: Lt TURBID / S.028 / pH: 6.5  Gluc: 150 / Ketone: NEGATIVE  / Bili: SMALL / Urobili: NORMAL   Blood: TRACE / Protein: 10 / Nitrite: NEGATIVE   Leuk Esterase: NEGATIVE / RBC: 3-5 / WBC 0-2   Sq Epi: OCC / Non Sq Epi: x / Bacteria: FEW      Amylase Serum--      Lipase [id number]       Ammonia--                          11.1   7.70  )-----------( 192      ( 2019 06:30 )             34.2                         11.2   8.58  )-----------( 174      ( 2019 07:00 )             33.4                         12.9   9.18  )-----------( 219      ( 2019 13:20 )             38.9       Imaging:    < from: ERCP (19 @ 09:07) >       The  film was normal. The bile duct was deeply cannulated. Contrast        was injected. I personally interpreted the bile duct images. The lower        third of the main bile duct contained filling defect(s) thought to be a        stone. A wire was passed into the biliary tree. Biliary sphincterotomy        was made with a sphincterotome. There was no post-sphincterotomy        bleeding. The biliary tree was swept with a 15 mm balloon starting at        the lower third of the main duct. Sludge was swept from the duct. A few        stones were removed. No stones remained.    Impression:          - A filling defect consistent with a stone was seen on                        the cholangiogram.    - A sphincterotomy was performed.                       - The biliary tree was swept.                       - Choledocholithiasis was found. Complete removal was                        accomplished by biliary sphincterotomy and balloon             extraction.    < end of copied text >

## 2019-04-27 NOTE — PROGRESS NOTE ADULT - ASSESSMENT
Impression:  1) Gallstone pancreatitis s/p ERCP 4/26/19 with removal of stones and biliary sphincterotomy  2) History of cholecystectomy (2018)    Recommendations:  - Clear liquid diet  - Trend CMP  - Supportive care per primary team    Tiffanie Alfonso MD  Gastroenterology Fellow  [phone]  Please page on call fellow on weekends and after 5pm on weekdays

## 2019-04-27 NOTE — PROGRESS NOTE ADULT - PROBLEM SELECTOR PLAN 2
Pt is s/p cholecystectomy but retained stones led to obstruction. Elevated bili, reports pruritis  -NPO for ERCP this morning  -benadryl prn for pruritis Pt is s/p cholecystectomy but retained stones led to obstruction. Elevated bili, reports pruritis, now improving  -advance to [facility]  -benadryl prn for pruritis

## 2019-04-27 NOTE — DISCHARGE NOTE PROVIDER - PROVIDER TOKENS
FREE:[LAST:[Jed],FIRST:[Reynold],PHONE:[phone]],FAX:[(   [phone]    -],ADDRESS:[[address] [place] [address]]],PROVIDER:[[id number]]]

## 2019-04-27 NOTE — PROGRESS NOTE ADULT - PROBLEM SELECTOR PLAN 3
Low suspicion ACS for L chest pain in patient with no risk factors. trop neg, EKG wnl. CTA chest neg for PE, dissection, or other pulmonary process but did show cardiomegaly and enlarged pulmonary artery. No prior TTE in EMR.  - TTE Low suspicion for ACS for L chest pain in patient with no risk factors. trop neg, EKG wnl. CTA chest neg for PE, dissection, or other pulmonary process but did show cardiomegaly and enlarged pulmonary artery. TTE wnl w EF 69.

## 2019-04-27 NOTE — DISCHARGE NOTE PROVIDER - NSDCFUADDINST_GEN_ALL_CORE_FT
You are to follow-up in the next week with [facility] Division of Gastroenterology at [facility]. Please call [phone] for an appointment. You are to follow-up in the next week with [facility] Division of Gastroenterology at [facility]. Please call [phone] for an appointment.    Follow up with a primary care doctor within the next week. You can call: [facility] Find a Physician helpline ([phone]) for assistance. Take copies of your reports given to you.  Take all of your medications as previously prescribed.

## 2019-04-28 DIAGNOSIS — R50.9 FEVER, UNSPECIFIED: ICD-10-CM

## 2019-04-28 LAB
-  AMIKACIN: SIGNIFICANT CHANGE UP
-  AMPICILLIN/SULBACTAM: SIGNIFICANT CHANGE UP
-  AMPICILLIN: SIGNIFICANT CHANGE UP
-  AZTREONAM: SIGNIFICANT CHANGE UP
-  CEFAZOLIN: SIGNIFICANT CHANGE UP
-  CEFEPIME: SIGNIFICANT CHANGE UP
-  CEFOXITIN: SIGNIFICANT CHANGE UP
-  CEFTAZIDIME: SIGNIFICANT CHANGE UP
-  CEFTRIAXONE: SIGNIFICANT CHANGE UP
-  CIPROFLOXACIN: SIGNIFICANT CHANGE UP
-  ERTAPENEM: SIGNIFICANT CHANGE UP
-  GENTAMICIN: SIGNIFICANT CHANGE UP
-  IMIPENEM: SIGNIFICANT CHANGE UP
-  LEVOFLOXACIN: SIGNIFICANT CHANGE UP
-  MEROPENEM: SIGNIFICANT CHANGE UP
-  NITROFURANTOIN: SIGNIFICANT CHANGE UP
-  PIPERACILLIN/TAZOBACTAM: SIGNIFICANT CHANGE UP
-  TIGECYCLINE: SIGNIFICANT CHANGE UP
-  TOBRAMYCIN: SIGNIFICANT CHANGE UP
-  TRIMETHOPRIM/SULFAMETHOXAZOLE: SIGNIFICANT CHANGE UP
ALBUMIN SERPL ELPH-MCNC: 4 G/DL — SIGNIFICANT CHANGE UP (ref 3.3–5)
ALP SERPL-CCNC: 144 U/L — HIGH (ref 40–120)
ALT FLD-CCNC: 85 U/L — HIGH (ref 4–33)
ANION GAP SERPL CALC-SCNC: 16 MMO/L — HIGH (ref 7–14)
AST SERPL-CCNC: 133 U/L — HIGH (ref 4–32)
BACTERIA UR CULT: SIGNIFICANT CHANGE UP
BILIRUB SERPL-MCNC: 1.7 MG/DL — HIGH (ref 0.2–1.2)
BUN SERPL-MCNC: 9 MG/DL — SIGNIFICANT CHANGE UP (ref 7–23)
CALCIUM SERPL-MCNC: 9.4 MG/DL — SIGNIFICANT CHANGE UP (ref 8.4–10.5)
CHLORIDE SERPL-SCNC: 98 MMOL/L — SIGNIFICANT CHANGE UP (ref 98–107)
CO2 SERPL-SCNC: 21 MMOL/L — LOW (ref 22–31)
CREAT SERPL-MCNC: 0.77 MG/DL — SIGNIFICANT CHANGE UP (ref 0.5–1.3)
GLUCOSE SERPL-MCNC: 132 MG/DL — HIGH (ref 70–99)
HCT VFR BLD CALC: 35.2 % — SIGNIFICANT CHANGE UP (ref 34.5–45)
HGB BLD-MCNC: 11.6 G/DL — SIGNIFICANT CHANGE UP (ref 11.5–15.5)
LIDOCAIN IGE QN: 249.1 U/L — HIGH (ref 7–60)
MAGNESIUM SERPL-MCNC: 2 MG/DL — SIGNIFICANT CHANGE UP (ref 1.6–2.6)
MCHC RBC-ENTMCNC: 32.1 PG — SIGNIFICANT CHANGE UP (ref 27–34)
MCHC RBC-ENTMCNC: 33 % — SIGNIFICANT CHANGE UP (ref 32–36)
MCV RBC AUTO: 97.5 FL — SIGNIFICANT CHANGE UP (ref 80–100)
METHOD TYPE: SIGNIFICANT CHANGE UP
NRBC # FLD: 0 K/UL — SIGNIFICANT CHANGE UP (ref 0–0)
ORGANISM # SPEC MICROSCOPIC CNT: SIGNIFICANT CHANGE UP
ORGANISM # SPEC MICROSCOPIC CNT: SIGNIFICANT CHANGE UP
PHOSPHATE SERPL-MCNC: 2.9 MG/DL — SIGNIFICANT CHANGE UP (ref 2.5–4.5)
PLATELET # BLD AUTO: 230 K/UL — SIGNIFICANT CHANGE UP (ref 150–400)
PMV BLD: 10.7 FL — SIGNIFICANT CHANGE UP (ref 7–13)
POTASSIUM SERPL-MCNC: 3.4 MMOL/L — LOW (ref 3.5–5.3)
POTASSIUM SERPL-SCNC: 3.4 MMOL/L — LOW (ref 3.5–5.3)
PROT SERPL-MCNC: 7.8 G/DL — SIGNIFICANT CHANGE UP (ref 6–8.3)
RBC # BLD: 3.61 M/UL — LOW (ref 3.8–5.2)
RBC # FLD: 13.9 % — SIGNIFICANT CHANGE UP (ref 10.3–14.5)
SODIUM SERPL-SCNC: 135 MMOL/L — SIGNIFICANT CHANGE UP (ref 135–145)
WBC # BLD: 6.37 K/UL — SIGNIFICANT CHANGE UP (ref 3.8–10.5)
WBC # FLD AUTO: 6.37 K/UL — SIGNIFICANT CHANGE UP (ref 3.8–10.5)

## 2019-04-28 PROCEDURE — 99233 SBSQ HOSP IP/OBS HIGH 50: CPT | Mod: GC

## 2019-04-28 RX ORDER — PIPERACILLIN AND TAZOBACTAM 4; .5 G/20ML; G/20ML
3.38 INJECTION, POWDER, LYOPHILIZED, FOR SOLUTION INTRAVENOUS EVERY 8 HOURS
Qty: 0 | Refills: 0 | Status: DISCONTINUED | OUTPATIENT
Start: 2019-04-28 | End: 2019-04-29

## 2019-04-28 RX ORDER — CEFTRIAXONE 500 MG/1
1 INJECTION, POWDER, FOR SOLUTION INTRAMUSCULAR; INTRAVENOUS EVERY 24 HOURS
Qty: 0 | Refills: 0 | Status: DISCONTINUED | OUTPATIENT
Start: 2019-04-29 | End: 2019-04-28

## 2019-04-28 RX ORDER — PIPERACILLIN AND TAZOBACTAM 4; .5 G/20ML; G/20ML
3.38 INJECTION, POWDER, LYOPHILIZED, FOR SOLUTION INTRAVENOUS ONCE
Qty: 0 | Refills: 0 | Status: COMPLETED | OUTPATIENT
Start: 2019-04-28 | End: 2019-04-28

## 2019-04-28 RX ORDER — PIPERACILLIN AND TAZOBACTAM 4; .5 G/20ML; G/20ML
3.38 INJECTION, POWDER, LYOPHILIZED, FOR SOLUTION INTRAVENOUS EVERY 8 HOURS
Qty: 0 | Refills: 0 | Status: DISCONTINUED | OUTPATIENT
Start: 2019-04-28 | End: 2019-04-28

## 2019-04-28 RX ORDER — CEFTRIAXONE 500 MG/1
1 INJECTION, POWDER, FOR SOLUTION INTRAMUSCULAR; INTRAVENOUS ONCE
Qty: 0 | Refills: 0 | Status: COMPLETED | OUTPATIENT
Start: 2019-04-28 | End: 2019-04-28

## 2019-04-28 RX ORDER — ACETAMINOPHEN 500 MG
650 TABLET ORAL ONCE
Qty: 0 | Refills: 0 | Status: COMPLETED | OUTPATIENT
Start: 2019-04-28 | End: 2019-04-28

## 2019-04-28 RX ORDER — IBUPROFEN 200 MG
400 TABLET ORAL EVERY 8 HOURS
Qty: 0 | Refills: 0 | Status: DISCONTINUED | OUTPATIENT
Start: 2019-04-28 | End: 2019-04-30

## 2019-04-28 RX ORDER — SODIUM CHLORIDE 9 MG/ML
1000 INJECTION, SOLUTION INTRAVENOUS
Qty: 0 | Refills: 0 | Status: DISCONTINUED | OUTPATIENT
Start: 2019-04-28 | End: 2019-04-30

## 2019-04-28 RX ADMIN — Medication 650 MILLIGRAM(S): at 20:47

## 2019-04-28 RX ADMIN — PIPERACILLIN AND TAZOBACTAM 25 GRAM(S): 4; .5 INJECTION, POWDER, LYOPHILIZED, FOR SOLUTION INTRAVENOUS at 22:10

## 2019-04-28 RX ADMIN — PIPERACILLIN AND TAZOBACTAM 200 GRAM(S): 4; .5 INJECTION, POWDER, LYOPHILIZED, FOR SOLUTION INTRAVENOUS at 10:21

## 2019-04-28 RX ADMIN — Medication 400 MILLIGRAM(S): at 14:02

## 2019-04-28 RX ADMIN — HYDROMORPHONE HYDROCHLORIDE 1 MILLIGRAM(S): 2 INJECTION INTRAMUSCULAR; INTRAVENOUS; SUBCUTANEOUS at 22:30

## 2019-04-28 RX ADMIN — PIPERACILLIN AND TAZOBACTAM 25 GRAM(S): 4; .5 INJECTION, POWDER, LYOPHILIZED, FOR SOLUTION INTRAVENOUS at 13:32

## 2019-04-28 RX ADMIN — HYDROMORPHONE HYDROCHLORIDE 1 MILLIGRAM(S): 2 INJECTION INTRAMUSCULAR; INTRAVENOUS; SUBCUTANEOUS at 09:19

## 2019-04-28 RX ADMIN — HYDROMORPHONE HYDROCHLORIDE 1 MILLIGRAM(S): 2 INJECTION INTRAMUSCULAR; INTRAVENOUS; SUBCUTANEOUS at 09:34

## 2019-04-28 RX ADMIN — CEFTRIAXONE 100 GRAM(S): 500 INJECTION, POWDER, FOR SOLUTION INTRAMUSCULAR; INTRAVENOUS at 05:04

## 2019-04-28 RX ADMIN — Medication 650 MILLIGRAM(S): at 00:38

## 2019-04-28 RX ADMIN — SODIUM CHLORIDE 150 MILLILITER(S): 9 INJECTION, SOLUTION INTRAVENOUS at 09:25

## 2019-04-28 RX ADMIN — Medication 650 MILLIGRAM(S): at 00:57

## 2019-04-28 RX ADMIN — Medication 400 MILLIGRAM(S): at 13:32

## 2019-04-28 RX ADMIN — Medication 650 MILLIGRAM(S): at 21:40

## 2019-04-28 RX ADMIN — HYDROMORPHONE HYDROCHLORIDE 1 MILLIGRAM(S): 2 INJECTION INTRAMUSCULAR; INTRAVENOUS; SUBCUTANEOUS at 22:11

## 2019-04-28 RX ADMIN — ENOXAPARIN SODIUM 40 MILLIGRAM(S): 100 INJECTION SUBCUTANEOUS at 06:42

## 2019-04-28 NOTE — PROVIDER CONTACT NOTE (OTHER) - BACKGROUND
patient is 34 year old female admit diagnosis acute pancreatitis without infection or necrosis
Patient admitted for acute gallstone pancreatitis without infection/necrosis with past medical history of obesity, gastritis, fatty liver, and asthma.
Patient admitted for gallstone pancreatitis with past medical history of gastritis and asthma
Patient was admitted with abdominal pain.

## 2019-04-28 NOTE — PROVIDER CONTACT NOTE (OTHER) - ACTION/TREATMENT ORDERED:
md notified, neetu ordered
MD Cedillo made aware; states she will be speaking with day team and for now will continue to monitor patient and give PRN benadryl/calamine
MD made aware. Administering dilaudid as ordered and continuing to monitor patient/reassess pain.
Md was informed, Tylenol , antibiotics and cultures were ordered.

## 2019-04-28 NOTE — PROVIDER CONTACT NOTE (OTHER) - SITUATION
patient states she is itchy
Patient complained of body aches and chills. Her temperature was checked and it was 100.8.
Patient reporting 8/10 abdominal pain radiating to new onset pain of b/l shoulders and legs.
Patient reports itching whenever given dilaudid IV push

## 2019-04-28 NOTE — PROGRESS NOTE ADULT - PROBLEM SELECTOR PLAN 2
Febrile ON, possibly 2/2 to UTI, however patient denies dysuria. Also possibly 2/2 to pancreatitis. C/w ceftriaxone, await UCx sensitivities. Febrile ON, possibly 2/2 to UTI, however patient denies dysuria. Also possibly 2/2 to pancreatitis. C/w Zosyn, await UCx sensitivities. Febrile ON, possibly 2/2 to UTI, however patient denies dysuria. Also possibly 2/2 to pancreatitis, howeer cholangitis is also possible given transaminitis and abd pain. C/w Zosyn, await UCx sensitivities and BCx. Febrile ON, possibly 2/2 to UTI, however patient denies dysuria. Also possibly 2/2 to pancreatitis, however cholangitis is also possible given transaminitis and abd pain. C/w Zosyn, await UCx sensitivities and BCx.

## 2019-04-28 NOTE — PROGRESS NOTE ADULT - ASSESSMENT
35yo Hebrew-speaking woman with hx gallstones s/p cholecystectomy (7/2018), gastritis, fatty liver, asthma presents with acute gallstone pancreatitis s/p ERCP.

## 2019-04-28 NOTE — PROGRESS NOTE ADULT - SUBJECTIVE AND OBJECTIVE BOX
Medicine Progress Note- PGY3    =========================================  CONTACT INFO  Adam Galarza M.D., PGY-3  Pager: [phone]  NS: [phone]    Chief Complaint: Patient is a 34y old  Female who presents with a chief complaint of Gallstone pancreatitis (2019 14:04)      INTERVAL HPI/OVERNIGHT EVENTS: Febrile ON . UCx +Ecoli. Started on ceftriaxone. endorsing mild abd pain. Tolerating PO.    REVIEW OF SYSTEMS:   CONSTITUTIONAL:  Denies nvd chills see hpi  HEENT:  Eyes:  Denies visual loss, blurred vision, double vision or scleral icterus. Ears, Nose, Throat:  Denies hearing loss, sneezing, congestion, runny nose or sore throat.  SKIN:  Denies rash or itching.  CARDIOVASCULAR:  Denies chest pain, CHRISTINA  RESPIRATORY:  Denies shortness of breath, cough or sputum.  GASTROINTESTINAL:  Denies anorexia, nausea, vomiting or diarrhea. see hpi  GENITOURINARY:  Denies any hematuria, dysuria  NEUROLOGICAL:  Denies headache, dizziness, syncope, paralysis, ataxia, numbness or tingling in the extremities. No change in bowel or bladder control.  MUSCULOSKELETAL:  Denies muscle, back pain, joint pain or stiffness.  HEMATOLOGIC:  Denies anemia, bleeding or bruising.  LYMPHATICS:  Denies enlarged nodes.   PSYCHIATRIC:  Denies history of depression or anxiety.  ENDOCRINOLOGIC:  Denies reports of sweating, cold or heat intolerance. No polyuria or polydipsia.  ALLERGIES:  Denies history of asthma, hives, eczema or rhinitis.    MEDICATIONS  (STANDING):  cefTRIAXone   IVPB 1 Gram(s) IV Intermittent every 24 hours  enoxaparin Injectable 40 milliGRAM(s) SubCutaneous every 24 hours  lactated ringers. 1000 milliLiter(s) (150 mL/Hr) IV Continuous <Continuous>    MEDICATIONS  (PRN):  ALBUTerol    90 MICROgram(s) HFA Inhaler 2 Puff(s) Inhalation every 6 hours PRN Shortness of Breath and/or Wheezing  calamine Lotion 1 Application(s) Topical daily PRN Itching  diphenhydrAMINE 25 milliGRAM(s) Oral every 6 hours PRN Rash and/or Itching  HYDROmorphone  Injectable 1 milliGRAM(s) IV Push every 4 hours PRN moderate - severe pain  ondansetron Injectable 4 milliGRAM(s) IV Push every 6 hours PRN Nausea and/or Vomiting      Vital Signs Last 24 Hrs  T(C): 37.2 (2019 05:07), Max: 38.2 (2019 00:38)  T(F): 98.9 (2019 05:07), Max: 100.8 (2019 00:38)  HR: 78 (2019 05:07) (62 - 100)  BP: 107/72 (2019 05:07) (107/72 - 135/79)  BP(mean): --  RR: 18 (2019 05:07) (16 - 18)  SpO2: 98% (2019 05:07) (98% - 100%)  Supplemental O2: [ ] No, on Room Air [ ] Yes,     I&O's Detail    2019 07:01  -  2019 07:00  --------------------------------------------------------  IN:    IV PiggyBack: 200 mL    lactated ringers.: 1650 mL    Oral Fluid: 884 mL  Total IN: 2734 mL    OUT:    Voided: 450 mL  Total OUT: 450 mL    Total NET: 2284 mL        CAPILLARY BLOOD GLUCOSE          PHYSICAL EXAM:  Daily     Daily Weight in k.4 (2019 05:07)   GENERAL: NAD,   HEAD:  NC/AT  EYES: EOMI, white sclerae  ENMT: MMM, no oropharyngeal lesions or erythema appreciated, dentition well appearing  Neck: trachea midline, no appreciable masses  Pulm: normal work of breathing, CTA b/l  CV: S1&S2+, rrr, no m/r/g appreciated  ABDOMEN: soft, nt, nd,   : no cva tenderness, no bunn placed  EXTREMITIES:  no appreciable edema in b/l LE  Neuro: A&Ox3, no focal deficits  SKIN: warm and dry, no visible rash    LABS:                        11.6   6.37  )-----------( 230      ( 2019 04:09 )             35.2     04-    135  |  98  |  9   ----------------------------<  132<H>  3.4<L>   |  21<L>  |  0.77    Ca    9.4      2019 04:09  Phos  2.9       Mg     2.0         TPro  7.8  /  Alb  4.0  /  TBili  1.7<H>  /  DBili  x   /  AST  133<H>  /  ALT  85<H>  /  AlkPhos  144<H>      LIVER FUNCTIONS - ( 2019 04:09 )  Alb: 4.0 g/dL / Pro: 7.8 g/dL / ALK PHOS: 144 u/L / ALT: 85 u/L / AST: 133 u/L / GGT: x     / T. Bili 1.7 mg/dL / D. Bili x                     Microbiology:    RADIOLOGY & ADDITIONAL TESTS:

## 2019-04-28 NOTE — PROGRESS NOTE ADULT - PROBLEM SELECTOR PLAN 5
BMI 37. Lipid panel w cholesterol 219, triglycerides 162, HDL 40. UA w glucosuria & ketonuria. A1C 6.3% pre-diabetes.  - encouraged wt loss and exercise

## 2019-04-28 NOTE — PROGRESS NOTE ADULT - PROBLEM SELECTOR PLAN 4
Low suspicion for ACS for L chest pain in patient with no risk factors. trop neg, EKG wnl. CTA chest neg for PE, dissection, or other pulmonary process but did show cardiomegaly and enlarged pulmonary artery. TTE wnl w EF 69.

## 2019-04-28 NOTE — PROGRESS NOTE ADULT - ATTENDING COMMENTS
Pt with low grade temp overnight 100.8 with slight increase in liver transaminases in the setting of some abdominal discomfort. Blood cx obtained overnight. Bilirubin decreased from prior levels but still elevated.   - agree with empiric tx with Zosyn   - f/u blood cx  - f/u GI recs  - advance diet as tolerated  Remainder of plan as discussed above.

## 2019-04-28 NOTE — PROGRESS NOTE ADULT - PROBLEM SELECTOR PLAN 1
Acute pancreatitis with CBD dilation to 2cm and intrahepatic ductal dilation with 4mm CBD stone on imaging and obstructive pattern LFT abnormalities with TB 4.6. Serum lipase >600. Pt denies etoh, hx pancreatitis. Calcium wnl. No fluid collections on imaging, afebrile, no leukoctysosis; no signs of infection currently. Pt is hemodynamically stable. Underwent ERCP on 4/26 showing choledocholithiasis w filling defect 2/2 stone, underwent sphincterotomy w balloon sweep and stone/sludge extraction.   -advance to [facility] per GI recs  -Pain control: dilaudid 1mg IV q4 mod-severe pain  -no sign pancreatic necrosis, monitor off abx  -s/p 4L NS, c/w LR @150cc/hr  -strict I/Os, monitor LFTs & lipase  -Zofran prn, monitor QTc Acute pancreatitis with CBD dilation to 2cm and intrahepatic ductal dilation with 4mm CBD stone on imaging and obstructive pattern LFT abnormalities with TB 4.6. Serum lipase >600. Pt denies etoh, hx pancreatitis. Calcium wnl. No fluid collections on imaging, afebrile, no leukoctysosis; no signs of infection currently. Pt is hemodynamically stable. Underwent ERCP on 4/26 showing choledocholithiasis w filling defect 2/2 stone, underwent sphincterotomy w balloon sweep and stone/sludge extraction.   -advance to [facility] per GI recs  -Pain control: dilaudid 1mg IV q4 mod-severe pain  -s/p 4L NS, c/w LR @150cc/hr  -strict I/Os, monitor LFTs & lipase  -Zofran prn, monitor QTc

## 2019-04-28 NOTE — PROVIDER CONTACT NOTE (OTHER) - RECOMMENDATIONS
notify MD, get order for benadryl
Assess need for further intervention; administer dilaudid as ordered.
Consider switching IV pain medication
Tylenol order.

## 2019-04-28 NOTE — PROVIDER CONTACT NOTE (OTHER) - ASSESSMENT
patient is alert and oreinetd x4. pt denies SOB, or chest pain, pt complains of itchiness
Patient is alert and calm.
Patient reporting 8/10 abdominal pain radiating to new onset pain of b/l shoulders and legs. Alll VS stable.
Patient reports itching whenever given dilaudid IV push; patient gets dilaudid every 4 hours. PRN Benadryl and calamine lotion is available

## 2019-04-28 NOTE — PROGRESS NOTE ADULT - PROBLEM SELECTOR PLAN 3
Pt is s/p cholecystectomy but retained stones led to obstruction. Elevated bili, reports pruritis, now improving  -advance to [facility]  -benadryl prn for pruritis

## 2019-04-28 NOTE — CHART NOTE - NSCHARTNOTEFT_GEN_A_CORE
Patient was found to have a temp of 100.8.  Patient was complaining of body aches and myalgia.  Tylenol was given.  Blood cultures and one dose of ceftriaxone ordered.  Patient had a positive urine culture > 100K e.coli with fever which warrants antibiotics.     Lenin Rodas, PGY 1  [phone]

## 2019-04-29 LAB
ALBUMIN SERPL ELPH-MCNC: 3.4 G/DL — SIGNIFICANT CHANGE UP (ref 3.3–5)
ALP SERPL-CCNC: 116 U/L — SIGNIFICANT CHANGE UP (ref 40–120)
ALT FLD-CCNC: 76 U/L — HIGH (ref 4–33)
ANION GAP SERPL CALC-SCNC: 12 MMO/L — SIGNIFICANT CHANGE UP (ref 7–14)
AST SERPL-CCNC: 104 U/L — HIGH (ref 4–32)
BILIRUB SERPL-MCNC: 1.4 MG/DL — HIGH (ref 0.2–1.2)
BUN SERPL-MCNC: 8 MG/DL — SIGNIFICANT CHANGE UP (ref 7–23)
CALCIUM SERPL-MCNC: 8.7 MG/DL — SIGNIFICANT CHANGE UP (ref 8.4–10.5)
CHLORIDE SERPL-SCNC: 101 MMOL/L — SIGNIFICANT CHANGE UP (ref 98–107)
CO2 SERPL-SCNC: 23 MMOL/L — SIGNIFICANT CHANGE UP (ref 22–31)
CREAT SERPL-MCNC: 0.67 MG/DL — SIGNIFICANT CHANGE UP (ref 0.5–1.3)
GLUCOSE SERPL-MCNC: 151 MG/DL — HIGH (ref 70–99)
HCT VFR BLD CALC: 35.9 % — SIGNIFICANT CHANGE UP (ref 34.5–45)
HGB BLD-MCNC: 11.8 G/DL — SIGNIFICANT CHANGE UP (ref 11.5–15.5)
LIDOCAIN IGE QN: 223.4 U/L — HIGH (ref 7–60)
MAGNESIUM SERPL-MCNC: 2 MG/DL — SIGNIFICANT CHANGE UP (ref 1.6–2.6)
MCHC RBC-ENTMCNC: 31.9 PG — SIGNIFICANT CHANGE UP (ref 27–34)
MCHC RBC-ENTMCNC: 32.9 % — SIGNIFICANT CHANGE UP (ref 32–36)
MCV RBC AUTO: 97 FL — SIGNIFICANT CHANGE UP (ref 80–100)
NRBC # FLD: 0 K/UL — SIGNIFICANT CHANGE UP (ref 0–0)
PHOSPHATE SERPL-MCNC: 3.9 MG/DL — SIGNIFICANT CHANGE UP (ref 2.5–4.5)
PLATELET # BLD AUTO: 220 K/UL — SIGNIFICANT CHANGE UP (ref 150–400)
PMV BLD: 10.9 FL — SIGNIFICANT CHANGE UP (ref 7–13)
POTASSIUM SERPL-MCNC: 3.7 MMOL/L — SIGNIFICANT CHANGE UP (ref 3.5–5.3)
POTASSIUM SERPL-SCNC: 3.7 MMOL/L — SIGNIFICANT CHANGE UP (ref 3.5–5.3)
PROT SERPL-MCNC: 6.7 G/DL — SIGNIFICANT CHANGE UP (ref 6–8.3)
RBC # BLD: 3.7 M/UL — LOW (ref 3.8–5.2)
RBC # FLD: 13.7 % — SIGNIFICANT CHANGE UP (ref 10.3–14.5)
SODIUM SERPL-SCNC: 136 MMOL/L — SIGNIFICANT CHANGE UP (ref 135–145)
SPECIMEN SOURCE: SIGNIFICANT CHANGE UP
SPECIMEN SOURCE: SIGNIFICANT CHANGE UP
WBC # BLD: 5.13 K/UL — SIGNIFICANT CHANGE UP (ref 3.8–10.5)
WBC # FLD AUTO: 5.13 K/UL — SIGNIFICANT CHANGE UP (ref 3.8–10.5)

## 2019-04-29 PROCEDURE — 99233 SBSQ HOSP IP/OBS HIGH 50: CPT

## 2019-04-29 RX ORDER — CEFTRIAXONE 500 MG/1
1 INJECTION, POWDER, FOR SOLUTION INTRAMUSCULAR; INTRAVENOUS EVERY 24 HOURS
Qty: 0 | Refills: 0 | Status: DISCONTINUED | OUTPATIENT
Start: 2019-04-29 | End: 2019-04-30

## 2019-04-29 RX ADMIN — HYDROMORPHONE HYDROCHLORIDE 1 MILLIGRAM(S): 2 INJECTION INTRAMUSCULAR; INTRAVENOUS; SUBCUTANEOUS at 06:10

## 2019-04-29 RX ADMIN — HYDROMORPHONE HYDROCHLORIDE 1 MILLIGRAM(S): 2 INJECTION INTRAMUSCULAR; INTRAVENOUS; SUBCUTANEOUS at 05:55

## 2019-04-29 RX ADMIN — HYDROMORPHONE HYDROCHLORIDE 1 MILLIGRAM(S): 2 INJECTION INTRAMUSCULAR; INTRAVENOUS; SUBCUTANEOUS at 14:08

## 2019-04-29 RX ADMIN — CEFTRIAXONE 100 GRAM(S): 500 INJECTION, POWDER, FOR SOLUTION INTRAMUSCULAR; INTRAVENOUS at 13:50

## 2019-04-29 RX ADMIN — SODIUM CHLORIDE 150 MILLILITER(S): 9 INJECTION, SOLUTION INTRAVENOUS at 05:36

## 2019-04-29 RX ADMIN — ONDANSETRON 4 MILLIGRAM(S): 8 TABLET, FILM COATED ORAL at 07:42

## 2019-04-29 RX ADMIN — PIPERACILLIN AND TAZOBACTAM 25 GRAM(S): 4; .5 INJECTION, POWDER, LYOPHILIZED, FOR SOLUTION INTRAVENOUS at 05:36

## 2019-04-29 RX ADMIN — ENOXAPARIN SODIUM 40 MILLIGRAM(S): 100 INJECTION SUBCUTANEOUS at 06:08

## 2019-04-29 RX ADMIN — HYDROMORPHONE HYDROCHLORIDE 1 MILLIGRAM(S): 2 INJECTION INTRAMUSCULAR; INTRAVENOUS; SUBCUTANEOUS at 13:53

## 2019-04-29 NOTE — PROGRESS NOTE ADULT - PROBLEM SELECTOR PLAN 2
Febrile ON, possibly 2/2 to UTI, however patient denies dysuria. Also possibly 2/2 to pancreatitis, however cholangitis is also possible given transaminitis and abd pain. C/w Zosyn, await UCx sensitivities and BCx. Febrile ON on 4/28, possibly 2/2 to UTI, however patient denies dysuria. Also possibly 2/2 to pancreatitis, however cholangitis is also possible given transaminitis and abd pain.

## 2019-04-29 NOTE — PROGRESS NOTE ADULT - ATTENDING COMMENTS
pt with pancreatitis due to CBD stone s/p ERCP with stone extraction.  Pt with one episode of fevers over the weekend - Ucx with E coli - less likely due to biliary source given blood cultures neg.  would d/c zosyn and restart ceftriaxone for 3 days total.  would advance diet today as tolerated.

## 2019-04-29 NOTE — PROGRESS NOTE ADULT - SUBJECTIVE AND OBJECTIVE BOX
MARIO MAURI  34y  Female      Subjective:     No acute overnight events. This am, patient endorse generalized back pain and muscle stiffness. She denies RUQ pain, nausea, vomiting diarrhea, and dizziness.      Meds    MEDICATIONS  (STANDING):  enoxaparin Injectable 40 milliGRAM(s) SubCutaneous every 24 hours  lactated ringers. 1000 milliLiter(s) (150 mL/Hr) IV Continuous <Continuous>  piperacillin/tazobactam IVPB. 3.375 Gram(s) IV Intermittent every 8 hours    MEDICATIONS  (PRN):  ALBUTerol    90 MICROgram(s) HFA Inhaler 2 Puff(s) Inhalation every 6 hours PRN Shortness of Breath and/or Wheezing  calamine Lotion 1 Application(s) Topical daily PRN Itching  diphenhydrAMINE 25 milliGRAM(s) Oral every 6 hours PRN Rash and/or Itching  HYDROmorphone  Injectable 1 milliGRAM(s) IV Push every 4 hours PRN moderate - severe pain  ibuprofen  Tablet. 400 milliGRAM(s) Oral every 8 hours PRN Severe Pain (7 - 10)  ondansetron Injectable 4 milliGRAM(s) IV Push every 6 hours PRN Nausea and/or Vomiting        Vital Signs Last 24 Hrs  T(C): 36.9 (29 Apr 2019 05:52), Max: 37.3 (28 Apr 2019 21:51)  T(F): 98.4 (29 Apr 2019 05:52), Max: 99.1 (28 Apr 2019 21:51)  HR: 68 (29 Apr 2019 05:52) (60 - 75)  BP: 116/70 (29 Apr 2019 05:52) (116/70 - 121/73)  BP(mean): --  RR: 17 (29 Apr 2019 05:52) (17 - 18)  SpO2: 98% (29 Apr 2019 05:52) (97% - 99%)    PHYSICAL EXAM:  GENERAL: NAD, well-groomed, well-developed  HEENT - NC/AT, pupils equal and reactive to light,  ; Moist mucous membranes, Good dentition, No lesions  NECK: Supple, No JVD  CHEST/LUNG: Clear to auscultation bilaterally; No rales, rhonchi, wheezing  HEART: Regular rate and rhythm; No murmurs, rubs, or gallops  ABDOMEN: Soft, Nontender, Nondistended; Bowel sounds present  EXTREMITIES:  2+ Peripheral Pulses, No clubbing, cyanosis, or edema  NEURO:  No Focal deficits, sensory and motor intact  SKIN: No rashes or lesions    Consultant(s) Notes Reviewed:  [x ] YES  [ ] NO  Care Discussed with Consultants/Other Providers [ x] YES  [ ] NO    LABS:      The Labs were reviewed by me   The Radiology was reviewed by me    EKG tracing reviewed by me    04-29    136  |  101  |  8   ----------------------------<  151<H>  3.7   |  23  |  0.67  04-28    135  |  98  |  9   ----------------------------<  132<H>  3.4<L>   |  21<L>  |  0.77  04-27    137  |  103  |  8   ----------------------------<  148<H>  3.4<L>   |  20<L>  |  0.56    Ca    8.7      29 Apr 2019 06:40  Ca    9.4      28 Apr 2019 04:09  Ca    9.5      27 Apr 2019 06:30  Phos  3.9     04-29  Mg     2.0     04-29    TPro  6.7  /  Alb  3.4  /  TBili  1.4<H>  /  DBili  x   /  AST  104<H>  /  ALT  76<H>  /  AlkPhos  116  04-29  TPro  7.8  /  Alb  4.0  /  TBili  1.7<H>  /  DBili  x   /  AST  133<H>  /  ALT  85<H>  /  AlkPhos  144<H>  04-28  TPro  6.8  /  Alb  3.5  /  TBili  1.7<H>  /  DBili  x   /  AST  44<H>  /  ALT  69<H>  /  AlkPhos  139<H>  04-27    Magnesium, Serum: 2.0 mg/dL (04-29-19 @ 06:40)  Magnesium, Serum: 2.0 mg/dL (04-28-19 @ 04:09)  Magnesium, Serum: 2.0 mg/dL (04-27-19 @ 06:30)    Phosphorus Level, Serum: 3.9 mg/dL (04-29-19 @ 06:40)  Phosphorus Level, Serum: 2.9 mg/dL (04-28-19 @ 04:09)  Phosphorus Level, Serum: 2.6 mg/dL (04-27-19 @ 06:30)                                              11.8   5.13  )-----------( 220      ( 29 Apr 2019 06:40 )             35.9                         11.6   6.37  )-----------( 230      ( 28 Apr 2019 04:09 )             35.2                         11.1   7.70  )-----------( 192      ( 27 Apr 2019 06:30 )             34.2     CAPILLARY BLOOD GLUCOSE              RADIOLOGY & ADDITIONAL TESTS:

## 2019-04-29 NOTE — PROGRESS NOTE ADULT - ASSESSMENT
33yo Turkmen-speaking woman with hx gallstones s/p cholecystectomy (7/2018), gastritis, fatty liver, asthma presents with acute gallstone pancreatitis s/p ERCP.

## 2019-04-29 NOTE — PROGRESS NOTE ADULT - PROBLEM SELECTOR PLAN 1
Acute pancreatitis with CBD dilation to 2cm and intrahepatic ductal dilation with 4mm CBD stone on imaging and obstructive pattern LFT abnormalities with TB 4.6. Serum lipase >600. Pt denies etoh, hx pancreatitis. Calcium wnl. No fluid collections on imaging, afebrile, no leukoctysosis; no signs of infection currently. Pt is hemodynamically stable. Underwent ERCP on 4/26 showing choledocholithiasis w filling defect 2/2 stone, underwent sphincterotomy w balloon sweep and stone/sludge extraction.   -advance to [facility] per GI recs  -Pain control: dilaudid 1mg IV q4 mod-severe pain  -s/p 4L NS, c/w LR @150cc/hr  -strict I/Os, monitor LFTs & lipase  -Zofran prn, monitor QTc

## 2019-04-30 ENCOUNTER — TRANSCRIPTION ENCOUNTER (OUTPATIENT)
Age: 34
End: 2019-04-30

## 2019-04-30 VITALS
OXYGEN SATURATION: 100 % | RESPIRATION RATE: 18 BRPM | SYSTOLIC BLOOD PRESSURE: 115 MMHG | DIASTOLIC BLOOD PRESSURE: 62 MMHG | TEMPERATURE: 98 F | HEART RATE: 87 BPM

## 2019-04-30 LAB
ALBUMIN SERPL ELPH-MCNC: 3.7 G/DL — SIGNIFICANT CHANGE UP (ref 3.3–5)
ALP SERPL-CCNC: 116 U/L — SIGNIFICANT CHANGE UP (ref 40–120)
ALT FLD-CCNC: 68 U/L — HIGH (ref 4–33)
ANION GAP SERPL CALC-SCNC: 14 MMO/L — SIGNIFICANT CHANGE UP (ref 7–14)
AST SERPL-CCNC: 65 U/L — HIGH (ref 4–32)
BILIRUB SERPL-MCNC: 1.3 MG/DL — HIGH (ref 0.2–1.2)
BUN SERPL-MCNC: 9 MG/DL — SIGNIFICANT CHANGE UP (ref 7–23)
CALCIUM SERPL-MCNC: 9.3 MG/DL — SIGNIFICANT CHANGE UP (ref 8.4–10.5)
CHLORIDE SERPL-SCNC: 101 MMOL/L — SIGNIFICANT CHANGE UP (ref 98–107)
CO2 SERPL-SCNC: 21 MMOL/L — LOW (ref 22–31)
CREAT SERPL-MCNC: 0.58 MG/DL — SIGNIFICANT CHANGE UP (ref 0.5–1.3)
GLUCOSE SERPL-MCNC: 145 MG/DL — HIGH (ref 70–99)
MAGNESIUM SERPL-MCNC: 2.4 MG/DL — SIGNIFICANT CHANGE UP (ref 1.6–2.6)
PHOSPHATE SERPL-MCNC: 4 MG/DL — SIGNIFICANT CHANGE UP (ref 2.5–4.5)
POTASSIUM SERPL-MCNC: 3.6 MMOL/L — SIGNIFICANT CHANGE UP (ref 3.5–5.3)
POTASSIUM SERPL-SCNC: 3.6 MMOL/L — SIGNIFICANT CHANGE UP (ref 3.5–5.3)
PROT SERPL-MCNC: 7.1 G/DL — SIGNIFICANT CHANGE UP (ref 6–8.3)
SODIUM SERPL-SCNC: 136 MMOL/L — SIGNIFICANT CHANGE UP (ref 135–145)

## 2019-04-30 PROCEDURE — 99239 HOSP IP/OBS DSCHRG MGMT >30: CPT | Mod: GC

## 2019-04-30 RX ORDER — IBUPROFEN 200 MG
1 TABLET ORAL
Qty: 0 | Refills: 0 | COMMUNITY

## 2019-04-30 RX ADMIN — Medication 400 MILLIGRAM(S): at 01:24

## 2019-04-30 RX ADMIN — ENOXAPARIN SODIUM 40 MILLIGRAM(S): 100 INJECTION SUBCUTANEOUS at 07:55

## 2019-04-30 RX ADMIN — CEFTRIAXONE 100 GRAM(S): 500 INJECTION, POWDER, FOR SOLUTION INTRAMUSCULAR; INTRAVENOUS at 14:06

## 2019-04-30 RX ADMIN — Medication 400 MILLIGRAM(S): at 02:19

## 2019-04-30 NOTE — PROGRESS NOTE ADULT - PROBLEM SELECTOR PROBLEM 3
Chest pain, atypical
Chest pain, atypical
Choledocholithiasis

## 2019-04-30 NOTE — PROGRESS NOTE ADULT - ATTENDING COMMENTS
pt tolerating po- no n/v/abd pain.  outpt follow up with GI.  discharge planning and coordination ~ 45mins.

## 2019-04-30 NOTE — PROGRESS NOTE ADULT - PROBLEM SELECTOR PROBLEM 1
Gallstone pancreatitis

## 2019-04-30 NOTE — PROGRESS NOTE ADULT - ASSESSMENT
33yo Yoruba-speaking woman with hx gallstones s/p cholecystectomy (7/2018), gastritis, fatty liver, asthma presents with acute gallstone pancreatitis s/p ERCP.

## 2019-04-30 NOTE — PROGRESS NOTE ADULT - REASON FOR ADMISSION
Gallstone pancreatitis

## 2019-04-30 NOTE — DISCHARGE NOTE NURSING/CASE MANAGEMENT/SOCIAL WORK - NSDCDPATPORTLINK_GEN_ALL_CORE
You can access the [facility] Patient Portal, offered by [facility], by registering with the following website: [email or web address]

## 2019-04-30 NOTE — PROGRESS NOTE ADULT - SUBJECTIVE AND OBJECTIVE BOX
DEVI MARTINIA  34y  Female      Subjective:     No acute overnight events. This am, patient denies any f/c/n/v/d/CP/SOB.      Meds    MEDICATIONS  (STANDING):  cefTRIAXone   IVPB 1 Gram(s) IV Intermittent every 24 hours  enoxaparin Injectable 40 milliGRAM(s) SubCutaneous every 24 hours  lactated ringers. 1000 milliLiter(s) (150 mL/Hr) IV Continuous <Continuous>    MEDICATIONS  (PRN):  ALBUTerol    90 MICROgram(s) HFA Inhaler 2 Puff(s) Inhalation every 6 hours PRN Shortness of Breath and/or Wheezing  calamine Lotion 1 Application(s) Topical daily PRN Itching  diphenhydrAMINE 25 milliGRAM(s) Oral every 6 hours PRN Rash and/or Itching  HYDROmorphone  Injectable 1 milliGRAM(s) IV Push every 4 hours PRN moderate - severe pain  ibuprofen  Tablet. 400 milliGRAM(s) Oral every 8 hours PRN Severe Pain (7 - 10)  ondansetron Injectable 4 milliGRAM(s) IV Push every 6 hours PRN Nausea and/or Vomiting        Vital Signs Last 24 Hrs  T(C): 36.7 (30 Apr 2019 05:12), Max: 37.4 (29 Apr 2019 21:57)  T(F): 98.1 (30 Apr 2019 05:12), Max: 99.3 (29 Apr 2019 21:57)  HR: 77 (30 Apr 2019 05:12) (77 - 95)  BP: 110/70 (30 Apr 2019 05:12) (110/70 - 124/79)  BP(mean): --  RR: 18 (30 Apr 2019 05:12) (18 - 18)  SpO2: 100% (30 Apr 2019 05:12) (99% - 100%)    PHYSICAL EXAM:  GENERAL: NAD, well-groomed, well-developed  HEENT - NC/AT, pupils equal and reactive to light  NECK: Supple, No JVD  CHEST/LUNG: Clear to auscultation bilaterally; No rales, rhonchi, wheezing  HEART: Regular rate and rhythm; No murmurs, rubs, or gallops  ABDOMEN: Soft, Nontender, Nondistended; Bowel sounds present  EXTREMITIES:  2+ Peripheral Pulses, No clubbing, cyanosis, or edema  NEURO:  No Focal deficits, sensory and motor intact  SKIN: No rashes or lesions    Consultant(s) Notes Reviewed:  [x ] YES  [ ] NO  Care Discussed with Consultants/Other Providers [ x] YES  [ ] NO    LABS:      The Labs were reviewed by me   The Radiology was reviewed by me    EKG tracing reviewed by me    04-30    136  |  101  |  9   ----------------------------<  145<H>  3.6   |  21<L>  |  0.58  04-29    136  |  101  |  8   ----------------------------<  151<H>  3.7   |  23  |  0.67  04-28    135  |  98  |  9   ----------------------------<  132<H>  3.4<L>   |  21<L>  |  0.77    Ca    9.3      30 Apr 2019 06:05  Ca    8.7      29 Apr 2019 06:40  Ca    9.4      28 Apr 2019 04:09  Phos  4.0     04-30  Mg     2.4     04-30    TPro  7.1  /  Alb  3.7  /  TBili  1.3<H>  /  DBili  x   /  AST  65<H>  /  ALT  68<H>  /  AlkPhos  116  04-30  TPro  6.7  /  Alb  3.4  /  TBili  1.4<H>  /  DBili  x   /  AST  104<H>  /  ALT  76<H>  /  AlkPhos  116  04-29  TPro  7.8  /  Alb  4.0  /  TBili  1.7<H>  /  DBili  x   /  AST  133<H>  /  ALT  85<H>  /  AlkPhos  144<H>  04-28    Magnesium, Serum: 2.4 mg/dL (04-30-19 @ 06:05)  Magnesium, Serum: 2.0 mg/dL (04-29-19 @ 06:40)  Magnesium, Serum: 2.0 mg/dL (04-28-19 @ 04:09)    Phosphorus Level, Serum: 4.0 mg/dL (04-30-19 @ 06:05)  Phosphorus Level, Serum: 3.9 mg/dL (04-29-19 @ 06:40)  Phosphorus Level, Serum: 2.9 mg/dL (04-28-19 @ 04:09)                                              11.8   5.13  )-----------( 220      ( 29 Apr 2019 06:40 )             35.9                         11.6   6.37  )-----------( 230      ( 28 Apr 2019 04:09 )             35.2     CAPILLARY BLOOD GLUCOSE              RADIOLOGY & ADDITIONAL TESTS: DEVI MARTINIA  34y  Female      Subjective:     No acute overnight events. This am, patient denies any f/c/n/v/d/CP/SOB.      Meds    MEDICATIONS  (STANDING):  cefTRIAXone   IVPB 1 Gram(s) IV Intermittent every 24 hours  enoxaparin Injectable 40 milliGRAM(s) SubCutaneous every 24 hours  lactated ringers. 1000 milliLiter(s) (150 mL/Hr) IV Continuous <Continuous>    MEDICATIONS  (PRN):  ALBUTerol    90 MICROgram(s) HFA Inhaler 2 Puff(s) Inhalation every 6 hours PRN Shortness of Breath and/or Wheezing  calamine Lotion 1 Application(s) Topical daily PRN Itching  diphenhydrAMINE 25 milliGRAM(s) Oral every 6 hours PRN Rash and/or Itching  HYDROmorphone  Injectable 1 milliGRAM(s) IV Push every 4 hours PRN moderate - severe pain  ibuprofen  Tablet. 400 milliGRAM(s) Oral every 8 hours PRN Severe Pain (7 - 10)  ondansetron Injectable 4 milliGRAM(s) IV Push every 6 hours PRN Nausea and/or Vomiting        Vital Signs Last 24 Hrs  T(C): 36.7 (30 Apr 2019 05:12), Max: 37.4 (29 Apr 2019 21:57)  T(F): 98.1 (30 Apr 2019 05:12), Max: 99.3 (29 Apr 2019 21:57)  HR: 77 (30 Apr 2019 05:12) (77 - 95)  BP: 110/70 (30 Apr 2019 05:12) (110/70 - 124/79)  BP(mean): --  RR: 18 (30 Apr 2019 05:12) (18 - 18)  SpO2: 100% (30 Apr 2019 05:12) (99% - 100%)    PHYSICAL EXAM:  GENERAL: NAD, well-groomed, well-developed  HEENT - NC/AT, pupils equal and reactive to light  NECK: Supple, No JVD  CHEST/LUNG: Clear to auscultation bilaterally; No rales, rhonchi, wheezing  HEART: Regular rate and rhythm; No murmurs, rubs, or gallops  ABDOMEN: Soft, Nontender, Nondistended; Bowel sounds present  EXTREMITIES:  2+ Peripheral Pulses, No clubbing, cyanosis, or edema  NEURO:  No Focal deficits, sensory and motor intact  SKIN: No rashes or lesions    Consultant(s) Notes Reviewed:  [x ] YES  [ ] NO  Care Discussed with Consultants/Other Providers [ x] YES  [ ] NO    LABS:      The Labs were reviewed by me   The Radiology was reviewed by me    EKG tracing reviewed by me    04-30    136  |  101  |  9   ----------------------------<  145<H>  3.6   |  21<L>  |  0.58  04-29    136  |  101  |  8   ----------------------------<  151<H>  3.7   |  23  |  0.67  04-28    135  |  98  |  9   ----------------------------<  132<H>  3.4<L>   |  21<L>  |  0.77    Ca    9.3      30 Apr 2019 06:05  Ca    8.7      29 Apr 2019 06:40  Ca    9.4      28 Apr 2019 04:09  Phos  4.0     04-30  Mg     2.4     04-30    TPro  7.1  /  Alb  3.7  /  TBili  1.3<H>  /  DBili  x   /  AST  65<H>  /  ALT  68<H>  /  AlkPhos  116  04-30  TPro  6.7  /  Alb  3.4  /  TBili  1.4<H>  /  DBili  x   /  AST  104<H>  /  ALT  76<H>  /  AlkPhos  116  04-29  TPro  7.8  /  Alb  4.0  /  TBili  1.7<H>  /  DBili  x   /  AST  133<H>  /  ALT  85<H>  /  AlkPhos  144<H>  04-28    Magnesium, Serum: 2.4 mg/dL (04-30-19 @ 06:05)  Magnesium, Serum: 2.0 mg/dL (04-29-19 @ 06:40)  Magnesium, Serum: 2.0 mg/dL (04-28-19 @ 04:09)    Phosphorus Level, Serum: 4.0 mg/dL (04-30-19 @ 06:05)  Phosphorus Level, Serum: 3.9 mg/dL (04-29-19 @ 06:40)  Phosphorus Level, Serum: 2.9 mg/dL (04-28-19 @ 04:09)                                              11.8   5.13  )-----------( 220      ( 29 Apr 2019 06:40 )             35.9                         11.6   6.37  )-----------( 230      ( 28 Apr 2019 04:09 )             35.2     CAPILLARY BLOOD GLUCOSE

## 2019-05-03 LAB
BACTERIA BLD CULT: SIGNIFICANT CHANGE UP
BACTERIA BLD CULT: SIGNIFICANT CHANGE UP

## 2019-05-12 PROBLEM — K76.0 FATTY (CHANGE OF) LIVER, NOT ELSEWHERE CLASSIFIED: Chronic | Status: ACTIVE | Noted: 2019-04-25

## 2019-05-12 PROBLEM — E66.9 OBESITY, UNSPECIFIED: Chronic | Status: ACTIVE | Noted: 2019-04-25

## 2019-05-12 PROBLEM — K29.70 GASTRITIS, UNSPECIFIED, WITHOUT BLEEDING: Chronic | Status: ACTIVE | Noted: 2019-04-25

## 2019-08-20 ENCOUNTER — APPOINTMENT (OUTPATIENT)
Dept: GASTROENTEROLOGY | Facility: HOSPITAL | Age: 34
End: 2019-08-20

## 2019-12-06 NOTE — H&P ADULT - NSHPLABSRESULTS_GEN_ALL_CORE
Encounter Summary
  Created on: 2019
 
 Shane Wyatttien BroussardJosue
 External Reference #: [id number]
 : 42
 Sex: Male
 
 Demographics
 
 
+-----------------------+---------------------------+
| Address               | [address]        |
|                       | [place]  [address] |
+-----------------------+---------------------------+
| Home Phone            | [phone]           |
+-----------------------+---------------------------+
| Preferred Language    | Unknown                   |
+-----------------------+---------------------------+
| Marital Status        |                    |
+-----------------------+---------------------------+
| Church Affiliation | [id number]                      |
+-----------------------+---------------------------+
| Race                  | Unknown                   |
+-----------------------+---------------------------+
| Ethnic Group          | Unknown                   |
+-----------------------+---------------------------+
 
 
 Author
 
 
+--------------+--------------------------------------------+
| Author       | [facility]  |
|              | and [place]                                |
+--------------+--------------------------------------------+
| Organization | [facility]  |
|              | and [place]                                |
+--------------+--------------------------------------------+
| Address      | Unknown                                    |
+--------------+--------------------------------------------+
| Phone        | Unavailable                                |
+--------------+--------------------------------------------+
 
 
 
 Support
 
 
+---------------+--------------+--------------------+-----------------+
| Name          | Relationship | Address            | Phone           |
+---------------+--------------+--------------------+-----------------+
| Opal Shane | ECON         | [address]     | [phone] |
|               |              | [place]   |                 |
|               |              | [id number]              |                 |
+---------------+--------------+--------------------+-----------------+
 
 
 
 
 Care Team Providers
 
 
+-----------------------+------+-----------------+
| Care Team Member Name | Role | Phone           |
+-----------------------+------+-----------------+
| Erwin Iniguez MD | PCP  | [phone] |
+-----------------------+------+-----------------+
 
 
 
 Encounter Details
 
 
+--------+-----------+----------------------+--------------------+-------------+
| Date   | Type      | Department           | Care Team          | Description |
+--------+-----------+----------------------+--------------------+-------------+
| 04/03/ | Hospital  |   [facility] GENERIC IP     |   Conversion       | Pain        |
| 2018   | Encounter | CONVERSION DEP  [address]  | Transaction,       |             |
|        |           | [address]           | Provider Unknown   |             |
|        |           | [place], [place]         | [phone]       |             |
|        |           | [address]           | [phone] (Fax) |             |
|        |           | [phone]         |                    |             |
+--------+-----------+----------------------+--------------------+-------------+
 
 
 
 Social History
 
 
+---------------+------------+-----------+--------+------------------+
| Tobacco Use   | Types      | Packs/Day | Years  | Date             |
|               |            |           | Used   |                  |
+---------------+------------+-----------+--------+------------------+
| Former Smoker | Cigarettes | 1.3       | 33     | Quit: 1996 |
+---------------+------------+-----------+--------+------------------+
 
 
 
+---------------------+---+---+---+
| Smokeless Tobacco:  |   |   |   |
| Never Used          |   |   |   |
+---------------------+---+---+---+
 
 
 
+-------------+-------------+---------+----------+
| Alcohol Use | Drinks/Week | oz/Week | Comments |
+-------------+-------------+---------+----------+
| No          |             |         |          |
+-------------+-------------+---------+----------+
 
 
 
+------------------+---------------+
| Sex Assigned at  | Date Recorded |
| Birth            |               |
+------------------+---------------+
| Not on file      |               |
 
+------------------+---------------+
 
 
 
+----------------+-------------+-------------+
| Job Start Date | Occupation  | Industry    |
+----------------+-------------+-------------+
| Not on file    | Not on file | Not on file |
+----------------+-------------+-------------+
 
 
 
+----------------+--------------+------------+
| Travel History | Travel Start | Travel End |
+----------------+--------------+------------+
 
 
 
+-------------------------------------+
| No recent travel history available. |
+-------------------------------------+
 documented as of this encounter
 
 Medications at Time of Discharge
 
 
+----------------------+----------------------+-----------+---------+----------+-----------+
| Medication           | Sig                  | Dispensed | Refills | Start    | End Date  |
|                      |                      |           |         | Date     |           |
+----------------------+----------------------+-----------+---------+----------+-----------+
|   acyclovir          | Apply  topically     |           | 0       |          |           |
| (ZOVIRAX) 5%         | every 3 hours.       |           |         |          |           |
| ointment             |                      |           |         |          |           |
+----------------------+----------------------+-----------+---------+----------+-----------+
|   albuterol (PROAIR  | Inhale 2 puffs into  |           | 0       |          |           |
| HFA) 90 mcg/puff     | the lungs every 6    |           |         |          |           |
| inhaler              | hours as needed for  |           |         |          |           |
|                      | Wheezing.            |           |         |          |           |
+----------------------+----------------------+-----------+---------+----------+-----------+
|   digoxin (LANOXIN)  | Take 125 mcg by      |           | 0       |          |           |
| 250 mcg tablet       | mouth Daily.      |           |         |          |           |
|                      | capsule daily        |           |         |          |           |
+----------------------+----------------------+-----------+---------+----------+-----------+
|   ferrous sulfate    | Take 325 mg by mouth |           | 0       | 20 |           |
| 325 mg tablet        |  3 times daily.      |           |         | 12       |           |
+----------------------+----------------------+-----------+---------+----------+-----------+
|   fluticasone        | one spray in each    |           | 0       | 20 |           |
| (FLONASE) 50         | nostril daily as     |           |         | 12       |           |
| mcg/nasal spray      | needed               |           |         |          |           |
+----------------------+----------------------+-----------+---------+----------+-----------+
|                      | Inhale 1 puff into   |           | 0       |          |           |
| fluticasone-salmeter | the lungs Twice      |           |         |          |           |
| ol (ADVAIR) 500-50   | Daily.               |           |         |          |           |
| mcg/puff diskus      |                      |           |         |          |           |
| inhaler              |                      |           |         |          |           |
+----------------------+----------------------+-----------+---------+----------+-----------+
|   folic acid 1 mg    | Take 1 mg by mouth   |           | 0       |          |           |
| tablet               | Daily.               |           |         |          |           |
+----------------------+----------------------+-----------+---------+----------+-----------+
|   furosemide (LASIX) | Take 40 mg by mouth  |           | 0       |          |           |
 
|  40 mg tablet        | Daily.               |           |         |          |           |
+----------------------+----------------------+-----------+---------+----------+-----------+
|   guaiFENesin        | Take 1,200 mg by     |           | 0       |          |           |
| (MUCINEX) 600 mg 12  | mouth 2 times daily. |           |         |          |           |
| hr tablet            |                      |           |         |          |           |
+----------------------+----------------------+-----------+---------+----------+-----------+
|   levothyroxine      | Take 75 mcg by mouth |           | 0       | 20 |           |
| (LEVOTHROID) 75 MCG  |  Daily.              |           |         | 12       |           |
| tablet               |                      |           |         |          |           |
+----------------------+----------------------+-----------+---------+----------+-----------+
|   metoclopramide     | Take 10 mg by mouth  |           | 0       | 20 |           |
| (REGLAN) 10 mg       | 4 times daily as     |           |         | 12       |           |
| tablet               | needed.              |           |         |          |           |
+----------------------+----------------------+-----------+---------+----------+-----------+
|                      | Apply  topically 2   |           | 0       |          |           |
| nystatin-triamcinolo | times daily.         |           |         |          |           |
| ne (MYCOLOG II)      |                      |           |         |          |           |
| cream                |                      |           |         |          |           |
+----------------------+----------------------+-----------+---------+----------+-----------+
|   omeprazole         | Take 20 mg by mouth  |           | 0       | 20 |           |
| (PRILOSEC) 20 mg     | 2 times daily.       |           |         | 12       |           |
| capsule              |                      |           |         |          |           |
+----------------------+----------------------+-----------+---------+----------+-----------+
|   potassium citrate  | Take 10 mEq by mouth |           | 0       |          |           |
| (UROCIT-K) 10 mEq SR |  2 times daily.      |           |         |          |           |
|  tablet              |                      |           |         |          |           |
+----------------------+----------------------+-----------+---------+----------+-----------+
|   predniSONE         | Take 10 mg by mouth  |           | 0       |          |           |
| (DELTASONE) 5 mg     | Daily.               |           |         |          |           |
| tablet               |                      |           |         |          |           |
+----------------------+----------------------+-----------+---------+----------+-----------+
|   tamsulosin         | Take 0.4 mg by mouth |           | 0       | 20 |           |
| (FLOMAX) 0.4 mg CAPS |  2 times daily.      |           |         | 12       |           |
+----------------------+----------------------+-----------+---------+----------+-----------+
|   acyclovir          | Take 400 mg by mouth |           | 0       | 20 |  |
| (ZOVIRAX) 400 MG     |  3 times daily as    |           |         | 12       | 9         |
| tablet               | needed.              |           |         |          |           |
+----------------------+----------------------+-----------+---------+----------+-----------+
|   Cholecalciferol    | Take 2,000 Units by  |           | 0       | 20 |  |
| (VITAMIN D3) 2000    | mouth Daily.         |           |         | 12       | 9         |
| UNITS CAPS           |                      |           |         |          |           |
+----------------------+----------------------+-----------+---------+----------+-----------+
|   cyanocobalamin     | injected             |           | 0       | 20 |  |
| (VITAMIN B-12) 1,000 | intramuscularly once |           |         | 12       | 9         |
|  mcg/mL injection    |  a month             |           |         |          |           |
+----------------------+----------------------+-----------+---------+----------+-----------+
|   diltiazem          | Take 120 mg by mouth |           | 0       |          |  |
| (DILT-XR) 120 mg 24  |  Daily.              |           |         |          | 9         |
| hr capsule           |                      |           |         |          |           |
+----------------------+----------------------+-----------+---------+----------+-----------+
|   loratadine         | Take 10 mg by mouth  |           | 0       |          |  |
| (CLARITIN) 10 mg     | Daily.               |           |         |          | 9         |
| tablet               |                      |           |         |          |           |
+----------------------+----------------------+-----------+---------+----------+-----------+
|   losartan (COZAAR)  | Take 100 mg by mouth |           | 0       |          |  |
| 100 MG tablet        |  Daily. 1/2 daily    |           |         |          | 9         |
+----------------------+----------------------+-----------+---------+----------+-----------+
|   methotrexate 2.5   | Take 15 mg by mouth  |           | 0       |          |  |
| mg tablet            | Once a week.         |           |         |          | 9         |
+----------------------+----------------------+-----------+---------+----------+-----------+
 
|                      | Take 1 tablet by     |           | 0       |          |  |
| oxyCODONE-acetaminop | mouth every 4 hours  |           |         |          | 9         |
| hen (PERCOCET) 5-325 | as needed for Pain.  |           |         |          |           |
|  mg per tablet       |                      |           |         |          |           |
+----------------------+----------------------+-----------+---------+----------+-----------+
|   pseudoePHEDrine    | Take 30 mg by mouth  |           | 0       |          |  |
| (SUDOGEST) 30 mg     | every 4 hours as     |           |         |          | 9         |
| tablet               | needed for           |           |         |          |           |
|                      | Congestion.          |           |         |          |           |
+----------------------+----------------------+-----------+---------+----------+-----------+
|   rivaroxaban        | Take 20 mg by mouth  |           | 0       |          |  |
| (XARELTO) 20 mg      | Daily (with dinner). |           |         |          | 9         |
| tablet               |                      |           |         |          |           |
+----------------------+----------------------+-----------+---------+----------+-----------+
|   sertraline         | 2 tablets daily      |           | 0       | 20 |  |
| (ZOLOFT) 100 mg      |                      |           |         | 12       | 9         |
| tablet               |                      |           |         |          |           |
+----------------------+----------------------+-----------+---------+----------+-----------+
 documented as of this encounter
 
 Plan of Treatment
 
 
+--------+---------+-------------+----------------------+-------------+
| Date   | Type    | Specialty   | Care Team            | Description |
+--------+---------+-------------+----------------------+-------------+
| / | Office  | Pulmonology |   [facility],          |             |
|    | Visit   |             | Jessica Cheung,   |             |
|        |         |             | MD [address] |             |
|        |         |             |   [place],   |             |
|        |         |             | [place] [address]             |             |
|        |         |             | [phone]         |             |
|        |         |             | [phone] (Fax)   |             |
+--------+---------+-------------+----------------------+-------------+
| / | Office  | Cardiology  |   [place], M[place], |             |
| 2020   | Visit   |             |  MD  [address]   |             |
|        |         |             | [place]  [place]  |             |
|        |         |             | [address]  [phone]  |             |
|        |         |             |  [phone] (Fax)  |             |
+--------+---------+-------------+----------------------+-------------+
 documented as of this encounter
 
 Procedures
 
 
+--------------------+--------+-------------+----------------------+----------------------+
| Procedure Name     | Priori | Date/Time   | Associated Diagnosis | Comments             |
|                    | ty     |             |                      |                      |
+--------------------+--------+-------------+----------------------+----------------------+
| US HEAD NECK SOFT  | Routin | 12/10/2010  |                      |   Results for this   |
| TISSUE             | e      |  5:13 AM    |                      | procedure are in the |
|                    |        | PST         |                      |  results section.    |
+--------------------+--------+-------------+----------------------+----------------------+
 documented in this encounter
 
 Results
 US Head Neck Soft Tissue (12/10/2010  5:13 AM PST)
 
+----------+
| Specimen |
 
+----------+
|          |
+----------+
 
 
 
+------------------------------------------------------------------------+--------------+
| Narrative                                                              | Performed At |
+------------------------------------------------------------------------+--------------+
|   This is a non-reportable procedure without a radiologist report and  |              |
| is  used for image storage only                                        |              |
+------------------------------------------------------------------------+--------------+
 
 
 
+--------------------------------------------------------------------------------------+
| Procedure Note                                                                       |
+--------------------------------------------------------------------------------------+
|   Andrzej Steven Irvin - 2019  4:21 AM PDT  This is a non-reportable procedure  |
| without a radiologist report and isused for image storage only                       |
+--------------------------------------------------------------------------------------+
 documented in this encounter
 
 Visit Diagnoses
 
 
+--------------------------+
| Diagnosis                |
+--------------------------+
|   Pain  Generalized pain |
+--------------------------+
 documented in this encounter 12.9   9.18  )-----------( 219      ( 25 Apr 2019 13:20 )             38.9     Hemoglobin: 12.9 g/dL (04-25 @ 13:20)    CBC Full  -  ( 25 Apr 2019 13:20 )  WBC Count : 9.18 K/uL  RBC Count : 4.01 M/uL  Hemoglobin : 12.9 g/dL  Hematocrit : 38.9 %  Platelet Count - Automated : 219 K/uL  Mean Cell Volume : 97.0 fL  Mean Cell Hemoglobin : 32.2 pg  Mean Cell Hemoglobin Concentration : 33.2 %  Auto Neutrophil # : 8.12 K/uL  Auto Lymphocyte # : 0.56 K/uL  Auto Monocyte # : 0.41 K/uL  Auto Eosinophil # : 0.01 K/uL  Auto Basophil # : 0.02 K/uL  Auto Neutrophil % : 88.4 %  Auto Lymphocyte % : 6.1 %  Auto Monocyte % : 4.5 %  Auto Eosinophil % : 0.1 %  Auto Basophil % : 0.2 %    04-25    137  |  99  |  6<L>  ----------------------------<  315<H>  3.4<L>   |  22  |  0.58    Ca    9.1      25 Apr 2019 13:20  Phos  2.2     04-25  Mg     2.1     04-25    TPro  7.1  /  Alb  3.9  /  TBili  4.6<H>  /  DBili  x   /  AST  109<H>  /  ALT  121<H>  /  AlkPhos  174<H>  04-25    Creatinine Trend: 0.58<--, 0.71<--  LIVER FUNCTIONS - ( 25 Apr 2019 13:20 )  Alb: 3.9 g/dL / Pro: 7.1 g/dL / ALK PHOS: 174 u/L / ALT: 121 u/L / AST: 109 u/L / GGT: x           PT/INR - ( 25 Apr 2019 13:20 )   PT: 11.4 SEC;   INR: 1.03     PTT - ( 25 Apr 2019 13:20 )  PTT:26.6 SEC    15:30 - VBG - pH: 7.32  | pCO2: 50    | pO2: 25    | Lactate: 1.7      trop <6  lipase >600  hcg <5      EKG: NSR rate 72, intervals wnl, QTc 442, no ST changes or TWI    MICROBIOLOGY:    IMAGING:  < from: CT Angio Chest w/ IV Cont (04.25.19 @ 16:03) >    IMPRESSION:     No intramural hematoma or dissection    Acute pancreatitis secondary to 4 mm CBD stone. No abscess or pseudocyst    < end of copied text >        Labs, imaging, EKG personally reviewed ekg, 4/25, nsr, 72bpm, qtc 442, no acute Tw or ST changes - my reading     12.9   9.18  )-----------( 219      ( 25 Apr 2019 13:20 )             38.9     Hemoglobin: 12.9 g/dL (04-25 @ 13:20)    CBC Full  -  ( 25 Apr 2019 13:20 )  WBC Count : 9.18 K/uL  RBC Count : 4.01 M/uL  Hemoglobin : 12.9 g/dL  Hematocrit : 38.9 %  Platelet Count - Automated : 219 K/uL  Mean Cell Volume : 97.0 fL  Mean Cell Hemoglobin : 32.2 pg  Mean Cell Hemoglobin Concentration : 33.2 %  Auto Neutrophil # : 8.12 K/uL  Auto Lymphocyte # : 0.56 K/uL  Auto Monocyte # : 0.41 K/uL  Auto Eosinophil # : 0.01 K/uL  Auto Basophil # : 0.02 K/uL  Auto Neutrophil % : 88.4 %  Auto Lymphocyte % : 6.1 %  Auto Monocyte % : 4.5 %  Auto Eosinophil % : 0.1 %  Auto Basophil % : 0.2 %    04-25    137  |  99  |  6<L>  ----------------------------<  315<H>  3.4<L>   |  22  |  0.58    Ca    9.1      25 Apr 2019 13:20  Phos  2.2     04-25  Mg     2.1     04-25    TPro  7.1  /  Alb  3.9  /  TBili  4.6<H>  /  DBili  x   /  AST  109<H>  /  ALT  121<H>  /  AlkPhos  174<H>  04-25    Creatinine Trend: 0.58<--, 0.71<--  LIVER FUNCTIONS - ( 25 Apr 2019 13:20 )  Alb: 3.9 g/dL / Pro: 7.1 g/dL / ALK PHOS: 174 u/L / ALT: 121 u/L / AST: 109 u/L / GGT: x           PT/INR - ( 25 Apr 2019 13:20 )   PT: 11.4 SEC;   INR: 1.03     PTT - ( 25 Apr 2019 13:20 )  PTT:26.6 SEC    15:30 - VBG - pH: 7.32  | pCO2: 50    | pO2: 25    | Lactate: 1.7      trop <6  lipase >600  hcg <5      EKG: NSR rate 72, intervals wnl, QTc 442, no ST changes or TWI    MICROBIOLOGY:    IMAGING:  < from: CT Angio Chest w/ IV Cont (04.25.19 @ 16:03) >    IMPRESSION:     No intramural hematoma or dissection    Acute pancreatitis secondary to 4 mm CBD stone. No abscess or pseudocyst    < end of copied text >        Labs, imaging, EKG personally reviewed

## 2021-10-05 NOTE — ED ADULT NURSE NOTE - CCCP TRG CHIEF CMPLNT
Detail Level: Detailed abd pain Add 74013 Cpt? (Important Note: In 2017 The Use Of 27641 Is Being Tracked By Cms To Determine Future Global Period Reimbursement For Global Periods): no

## 2021-10-08 NOTE — ED ADULT TRIAGE NOTE - CCCP TRG CHIEF CMPLNT
HEARING AID CHECK    AUDIOLOGY/HEARING AIDS:    [employer]ia Insio 5Px ITE aids with HF4 Pro filters. 312 battery  NO warranty.    Ulisses Alicia  1/2/2021    SUBJECTIVE:  Reason for visit: Hearing aid check and reprogramming.     OBJECTIVE:  Services provided:   Otoscopy:  minimal cerumen bilaterally.  Cleaned Hearing Aid Parts: casing, microphone and dry cycle with vacuum pump  Replaced Hearing Aid Parts: wax filter  Programming: adjusted gain settings to match NAL-NL2 targets  Listening Check: both impressions: devices in proper working condition   Electroacoustic analysis: both impressions: completed on-ear Speechmapping    ASSESSMENT:  Patient initially scheduled for a hearing aid consultation appointment due to limited perceived benefit from hearing aids. Thoroughly counseled patient and patient's daughter that difficulty hearing is less likely due to the hearing aids and more likely due to significant deterioration of patient's auditory system (poor word recognition, etc). Patient's hearing aids are only 3.5 years old. Recommended complete reprogramming with hopes that additional fine tuning will provide patient with better access to sound. Clean and check revealed both devices to be in proper working condition. Completed on-ear Speechmapping and re-adjusted gain settings to match 100% NAL-NL2 targets. Patient reported satisfaction with sound quality of devices. Patient's daughter reported perceiving improvement in patient's responses immediately following programming. Patient to utilize new settings for at least two weeks. Patient's daughter to contact clinic if any additional fine tuning is needed. No additional questions or concerns at this time.     FOLLOW-UP:  Follow-up as needed.     
vomiting  left arm tingling started 2 hours/chest pain

## 2021-11-08 NOTE — PROGRESS NOTE ADULT - PROBLEM/PLAN-4
Additional Notes: Patient consent was obtained to proceed with the visit and recommended plan of care after discussion of all risks and benefits, including the risks of COVID-19 exposure.
DISPLAY PLAN FREE TEXT
Detail Level: Simple
DISPLAY PLAN FREE TEXT

## 2022-04-11 NOTE — PATIENT PROFILE ADULT - CONTRAINDICATIONS & PRECAUTIONS (SELECT ALL THAT APPLY)
*Follow up with primary care provider in 6 days for suture removal if suture is still in place.   *Take antibiotic as prescribed.   *Return for fevers, spreading redness, focal numbness/tingling or weakness, or any other concerning symptoms.          Discharge Instructions  Laceration (Cut)    You were seen today for a laceration (cut).  Your provider examined your laceration for any problems such a buried foreign body (like glass, a splinter, or gravel), or injury to blood vessels, tendons, and nerves.  Your provider may have also rinsed and/or scrubbed your laceration to help prevent an infection. It may not be possible to find all problems with your laceration on the first visit; occasionally foreign bodies or a tendon injury can go undetected.    Your laceration may have been closed in one of several ways:  No closure: many wounds will heal just fine without closure.  Stitches: regular stitches that require removal.  Staples: skin staples are often used in the scalp/head.  Wound adhesive (glue): skin glue can be used for certain lacerations and doesn t require removal.  Wound strips (aka Butterfly bandages or steri-strips): these are bandages that help to close a wound.  Absorbable stitches:  dissolving  stitches that go away on their own and usually don t require removal.    A small percentage of wounds will develop an infection regardless of how well the wound is cared for. Antibiotics are generally not indicated to prevent an infection so are only given for a small number of high-risk wounds. Some lacerations are too high risk to close, and are left open to heal because closure can increase the likelihood that an infection will develop.    Remember that all lacerations, no matter how expertly repaired, will cause scarring. We consider many factors, techniques, and materials, in our efforts to provide the best possible cosmetic outcome.    Generally, every Emergency Department visit should have a follow-up  clinic visit with either a primary or a specialty clinic/provider. Please follow-up as instructed by your emergency provider today.     Return to the Emergency Department right away if:  You have more redness, swelling, pain, drainage (pus), a bad smell, or red streaking from your laceration as these symptoms could indicate an infection.  You have a fever of 100.4 F or more.  You have bleeding that you cannot stop at home. If your cut starts to bleed, hold pressure on the bleeding area with a clean cloth or put pressure over the bandage.  If the bleeding does not stop after using constant pressure for 30 minutes, you should return to the Emergency Department for further treatment.  An area past the laceration is cool, pale, or blue compared with the other side, or has a slower return of color when squeezed.  Your dressing seems too tight or starts to get uncomfortable or painful. For children, signs of a problem might be irritability or restlessness.  You have loss of normal function or use of an area, such as being unable to straighten or bend a finger normally.  You have a numb area past the laceration.    Return to the Emergency Department or see your regular provider if:  The laceration starts to come open.   You have something coming out of the cut or a feeling that there is something in the laceration.  Your wound will not heal, or keeps breaking open. There can always be glass, wood, dirt or other things in any wound.  They will not always show up, even on x-rays.  If a wound does not heal, this may be why, and it is important to follow-up with your regular provider.    Home Care:  Take your dressing off in 12-24 hours, or as instructed by your provider, to check your laceration. Remove the dressing sooner if it seems too tight or painful, or if it is getting numb, tingly, or pale past the dressing.  Gently wash your laceration 1-2 times daily with clean water and mild soap. It is okay to shower or run clean  water over the laceration, but do not let the laceration soak in water (no swimming).  If your laceration was closed with wound adhesive or strips: pat it dry and leave it open to the air. For all other repairs: after you wash your laceration, or at least 2 times a day, apply antibiotic ointment (such as Neosporin  or Bacitracin ) to the laceration, then cover it with a Band-Aid  or gauze.  Keep the laceration clean. Wear gloves or other protective clothing if you are around dirt.    Follow-up for removal:  If your wound was closed with staples or regular stitches, they need to be removed according to the instructions and timeline specified by your provider today.  If your wound was closed with absorbable ( dissolving ) sutures, they should fall out, dissolve, or not be visible in about one week. If they are still visible, then they should be removed according to the instructions and timeline specified by your provider today.    Scars:  To help minimize scarring:  Wear sunscreen over the healed laceration when out in the sun.  Massage the area regularly once healed.  You may apply Vitamin E to the healed wound.  Wait. Scars improve in appearance over months and years.    If you were given a prescription for medicine here today, be sure to read all of the information (including the package insert) that comes with your prescription.  This will include important information about the medicine, its side effects, and any warnings that you need to know about.  The pharmacist who fills the prescription can provide more information and answer questions you may have about the medicine.  If you have questions or concerns that the pharmacist cannot address, please call or return to the Emergency Department.       Remember that you can always come back to the Emergency Department if you are not able to see your regular provider in the amount of time listed above, if you get any new symptoms, or if there is anything that worries  you.    Patient/surrogate refused vaccine...

## 2023-09-11 NOTE — H&P ADULT - GUM GEN PE MLT EXAM PC
Constitutional: No fever or chills  Eyes: No discharge or drainage  Ears, Nose, Mouth, Throat: No nasal discharge, no sore throat  Cardiovascular: +chest pain, no palpitations  Respiratory: No shortness of breath, no cough  Gastrointestinal: No nausea or vomiting, no abdominal pain, no diarrhea or constipation  Musculoskeletal: No joint pain, no swelling  Skin: No rashes or lesions  Neurological: +numbness, no weakness, tingling, no headache
not examined

## 2024-05-31 NOTE — H&P PST ADULT - ABILITY TO HEAR (WITH HEARING AID OR HEARING APPLIANCE IF NORMALLY USED):
Aflutter RVR
Adequate: hears normal conversation without difficulty

## 2024-08-13 ENCOUNTER — APPOINTMENT (OUTPATIENT)
Dept: INTERNAL MEDICINE | Facility: CLINIC | Age: 39
End: 2024-08-13

## 2025-04-17 NOTE — H&P PST ADULT - GASTROINTESTINAL COMMENTS
Wow, that's pathetic.  She will have to check with her insurance to see who else is covered.     Denies any nausea or vomiting last few days Slight tenderness on palpation

## 2025-05-05 NOTE — ED ADULT NURSE NOTE - OBJECTIVE STATEMENT
Quality 226: Preventive Care And Screening: Tobacco Use: Screening And Cessation Intervention: Patient screened for tobacco use and is an ex/non-smoker Detail Level: Detailed rec'd pt in room 9 with  at bedside. pt c/o upper abd pain for months...has been seen at clinic...has had uti, vag infection, been on abx... still having pain... worse after eating and dark stool.  20 gauge inserted right ac. blood and urine sent. pt to be given meds. endorsing to next shift